# Patient Record
Sex: FEMALE | URBAN - METROPOLITAN AREA
[De-identification: names, ages, dates, MRNs, and addresses within clinical notes are randomized per-mention and may not be internally consistent; named-entity substitution may affect disease eponyms.]

---

## 2017-01-13 ENCOUNTER — HOSPITAL ENCOUNTER (INPATIENT)
Facility: CLINIC | Age: 22
LOS: 2 days | Discharge: HOME | DRG: 637 | End: 2017-01-16
Attending: EMERGENCY MEDICINE | Admitting: SURGERY
Payer: COMMERCIAL

## 2017-01-13 ENCOUNTER — APPOINTMENT (OUTPATIENT)
Dept: GENERAL RADIOLOGY | Facility: CLINIC | Age: 22
DRG: 637 | End: 2017-01-13
Attending: EMERGENCY MEDICINE
Payer: COMMERCIAL

## 2017-01-13 ENCOUNTER — TELEPHONE (OUTPATIENT)
Dept: NURSING | Facility: CLINIC | Age: 22
End: 2017-01-13

## 2017-01-13 DIAGNOSIS — N28.9 RENAL INSUFFICIENCY: ICD-10-CM

## 2017-01-13 DIAGNOSIS — E10.9 TYPE 1 DIABETES MELLITUS WITHOUT COMPLICATION (H): Primary | ICD-10-CM

## 2017-01-13 DIAGNOSIS — E10.9 DIABETES MELLITUS TYPE 1 (H): ICD-10-CM

## 2017-01-13 DIAGNOSIS — E10.10 DIABETIC KETOACIDOSIS WITHOUT COMA ASSOCIATED WITH TYPE 1 DIABETES MELLITUS (H): ICD-10-CM

## 2017-01-13 DIAGNOSIS — E87.5 HYPERKALEMIA: ICD-10-CM

## 2017-01-13 DIAGNOSIS — R41.0 DELIRIUM: ICD-10-CM

## 2017-01-13 LAB
ALBUMIN SERPL-MCNC: 3.5 G/DL (ref 3.4–5)
ALBUMIN UR-MCNC: 100 MG/DL
ALP SERPL-CCNC: 128 U/L (ref 40–150)
ALT SERPL W P-5'-P-CCNC: 28 U/L (ref 0–50)
ANION GAP SERPL CALCULATED.3IONS-SCNC: 23 MMOL/L (ref 3–14)
APPEARANCE UR: ABNORMAL
AST SERPL W P-5'-P-CCNC: 22 U/L (ref 0–45)
BASOPHILS # BLD AUTO: 0 10E9/L (ref 0–0.2)
BASOPHILS NFR BLD AUTO: 0.1 %
BILIRUB SERPL-MCNC: 0.3 MG/DL (ref 0.2–1.3)
BILIRUB UR QL STRIP: NEGATIVE
BUN SERPL-MCNC: 27 MG/DL (ref 7–30)
CALCIUM SERPL-MCNC: 8.5 MG/DL (ref 8.5–10.1)
CHLORIDE SERPL-SCNC: 108 MMOL/L (ref 94–109)
CO2 BLDCOV-SCNC: 5 MMOL/L (ref 21–28)
CO2 SERPL-SCNC: 9 MMOL/L (ref 20–32)
COLOR UR AUTO: ABNORMAL
CREAT BLD-MCNC: 1.4 MG/DL (ref 0.52–1.04)
CREAT SERPL-MCNC: 1.45 MG/DL (ref 0.52–1.04)
DIFFERENTIAL METHOD BLD: ABNORMAL
EOSINOPHIL # BLD AUTO: 0 10E9/L (ref 0–0.7)
EOSINOPHIL NFR BLD AUTO: 0 %
ERYTHROCYTE [DISTWIDTH] IN BLOOD BY AUTOMATED COUNT: 14.4 % (ref 10–15)
GFR SERPL CREATININE-BSD FRML MDRD: 45 ML/MIN/1.7M2
GFR SERPL CREATININE-BSD FRML MDRD: 47 ML/MIN/1.7M2
GLUCOSE BLDC GLUCOMTR-MCNC: 270 MG/DL (ref 70–99)
GLUCOSE BLDC GLUCOMTR-MCNC: 287 MG/DL (ref 70–99)
GLUCOSE BLDC GLUCOMTR-MCNC: 371 MG/DL (ref 70–99)
GLUCOSE BLDC GLUCOMTR-MCNC: 378 MG/DL (ref 70–99)
GLUCOSE SERPL-MCNC: 294 MG/DL (ref 70–99)
GLUCOSE UR STRIP-MCNC: >1000 MG/DL
HBA1C MFR BLD: 13.7 % (ref 4.3–6)
HCG UR QL: NEGATIVE
HCT VFR BLD AUTO: 43.5 % (ref 35–47)
HGB BLD-MCNC: 13.8 G/DL (ref 11.7–15.7)
HGB UR QL STRIP: ABNORMAL
IMM GRANULOCYTES # BLD: 0.5 10E9/L (ref 0–0.4)
IMM GRANULOCYTES NFR BLD: 1.6 %
KETONES BLD-SCNC: 4.2 MMOL/L (ref 0–0.6)
KETONES UR STRIP-MCNC: >150 MG/DL
LACTATE BLD-SCNC: 2.7 MMOL/L (ref 0.7–2.1)
LACTATE BLD-SCNC: 2.9 MMOL/L (ref 0.7–2.1)
LEUKOCYTE ESTERASE UR QL STRIP: NEGATIVE
LYMPHOCYTES # BLD AUTO: 1.9 10E9/L (ref 0.8–5.3)
LYMPHOCYTES NFR BLD AUTO: 6.7 %
MAGNESIUM SERPL-MCNC: 2.2 MG/DL (ref 1.6–2.3)
MCH RBC QN AUTO: 30.4 PG (ref 26.5–33)
MCHC RBC AUTO-ENTMCNC: 31.7 G/DL (ref 31.5–36.5)
MCV RBC AUTO: 96 FL (ref 78–100)
MONOCYTES # BLD AUTO: 2 10E9/L (ref 0–1.3)
MONOCYTES NFR BLD AUTO: 7 %
NEUTROPHILS # BLD AUTO: 23.8 10E9/L (ref 1.6–8.3)
NEUTROPHILS NFR BLD AUTO: 84.6 %
NITRATE UR QL: NEGATIVE
NRBC # BLD AUTO: 0 10*3/UL
NRBC BLD AUTO-RTO: 0 /100
PCO2 BLDV: 26 MM HG (ref 40–50)
PH BLDV: 6.92 PH (ref 7.32–7.43)
PH UR STRIP: 5.5 PH (ref 5–7)
PHOSPHATE SERPL-MCNC: 5.3 MG/DL (ref 2.5–4.5)
PLATELET # BLD AUTO: 357 10E9/L (ref 150–450)
PO2 BLDV: 28 MM HG (ref 25–47)
POTASSIUM SERPL-SCNC: 5.6 MMOL/L (ref 3.4–5.3)
PROT SERPL-MCNC: 7.9 G/DL (ref 6.8–8.8)
RBC # BLD AUTO: 4.54 10E12/L (ref 3.8–5.2)
RBC #/AREA URNS AUTO: 2 /HPF (ref 0–2)
SAO2 % BLDV FROM PO2: 25 %
SODIUM SERPL-SCNC: 140 MMOL/L (ref 133–144)
SP GR UR STRIP: 1.01 (ref 1–1.03)
SQUAMOUS #/AREA URNS AUTO: 1 /HPF (ref 0–1)
URN SPEC COLLECT METH UR: ABNORMAL
UROBILINOGEN UR STRIP-MCNC: NORMAL MG/DL (ref 0–2)
WBC # BLD AUTO: 28.1 10E9/L (ref 4–11)
WBC #/AREA URNS AUTO: 13 /HPF (ref 0–2)

## 2017-01-13 PROCEDURE — 25000125 ZZHC RX 250: Performed by: EMERGENCY MEDICINE

## 2017-01-13 PROCEDURE — 96365 THER/PROPH/DIAG IV INF INIT: CPT | Performed by: EMERGENCY MEDICINE

## 2017-01-13 PROCEDURE — 99292 CRITICAL CARE ADDL 30 MIN: CPT | Mod: 25 | Performed by: EMERGENCY MEDICINE

## 2017-01-13 PROCEDURE — 85025 COMPLETE CBC W/AUTO DIFF WBC: CPT | Performed by: EMERGENCY MEDICINE

## 2017-01-13 PROCEDURE — 96368 THER/DIAG CONCURRENT INF: CPT | Performed by: EMERGENCY MEDICINE

## 2017-01-13 PROCEDURE — 80329 ANALGESICS NON-OPIOID 1 OR 2: CPT | Performed by: EMERGENCY MEDICINE

## 2017-01-13 PROCEDURE — 25000128 H RX IP 250 OP 636: Performed by: EMERGENCY MEDICINE

## 2017-01-13 PROCEDURE — 81025 URINE PREGNANCY TEST: CPT | Performed by: EMERGENCY MEDICINE

## 2017-01-13 PROCEDURE — 82803 BLOOD GASES ANY COMBINATION: CPT

## 2017-01-13 PROCEDURE — 83605 ASSAY OF LACTIC ACID: CPT | Performed by: EMERGENCY MEDICINE

## 2017-01-13 PROCEDURE — 99291 CRITICAL CARE FIRST HOUR: CPT | Mod: 25 | Performed by: EMERGENCY MEDICINE

## 2017-01-13 PROCEDURE — 87040 BLOOD CULTURE FOR BACTERIA: CPT | Performed by: EMERGENCY MEDICINE

## 2017-01-13 PROCEDURE — 71020 XR CHEST 2 VW: CPT

## 2017-01-13 PROCEDURE — 96375 TX/PRO/DX INJ NEW DRUG ADDON: CPT | Performed by: EMERGENCY MEDICINE

## 2017-01-13 PROCEDURE — 96366 THER/PROPH/DIAG IV INF ADDON: CPT | Performed by: EMERGENCY MEDICINE

## 2017-01-13 PROCEDURE — 82565 ASSAY OF CREATININE: CPT

## 2017-01-13 PROCEDURE — 83036 HEMOGLOBIN GLYCOSYLATED A1C: CPT | Performed by: EMERGENCY MEDICINE

## 2017-01-13 PROCEDURE — 83605 ASSAY OF LACTIC ACID: CPT

## 2017-01-13 PROCEDURE — 96361 HYDRATE IV INFUSION ADD-ON: CPT | Performed by: EMERGENCY MEDICINE

## 2017-01-13 PROCEDURE — 36415 COLL VENOUS BLD VENIPUNCTURE: CPT | Performed by: EMERGENCY MEDICINE

## 2017-01-13 PROCEDURE — 82010 KETONE BODYS QUAN: CPT | Performed by: EMERGENCY MEDICINE

## 2017-01-13 PROCEDURE — 84100 ASSAY OF PHOSPHORUS: CPT | Performed by: EMERGENCY MEDICINE

## 2017-01-13 PROCEDURE — 80053 COMPREHEN METABOLIC PANEL: CPT | Performed by: EMERGENCY MEDICINE

## 2017-01-13 PROCEDURE — 00000146 ZZHCL STATISTIC GLUCOSE BY METER IP

## 2017-01-13 PROCEDURE — 87086 URINE CULTURE/COLONY COUNT: CPT | Performed by: EMERGENCY MEDICINE

## 2017-01-13 PROCEDURE — 81001 URINALYSIS AUTO W/SCOPE: CPT | Performed by: EMERGENCY MEDICINE

## 2017-01-13 PROCEDURE — 82803 BLOOD GASES ANY COMBINATION: CPT | Performed by: EMERGENCY MEDICINE

## 2017-01-13 PROCEDURE — 83735 ASSAY OF MAGNESIUM: CPT | Performed by: EMERGENCY MEDICINE

## 2017-01-13 PROCEDURE — 93005 ELECTROCARDIOGRAM TRACING: CPT | Performed by: EMERGENCY MEDICINE

## 2017-01-13 PROCEDURE — 99285 EMERGENCY DEPT VISIT HI MDM: CPT | Performed by: EMERGENCY MEDICINE

## 2017-01-13 RX ORDER — DEXTROSE MONOHYDRATE 25 G/50ML
25-50 INJECTION, SOLUTION INTRAVENOUS
Status: DISCONTINUED | OUTPATIENT
Start: 2017-01-13 | End: 2017-01-14

## 2017-01-13 RX ORDER — LIDOCAINE 40 MG/G
CREAM TOPICAL
Status: DISCONTINUED | OUTPATIENT
Start: 2017-01-13 | End: 2017-01-16 | Stop reason: HOSPADM

## 2017-01-13 RX ADMIN — SODIUM BICARBONATE 50 MEQ: 84 INJECTION, SOLUTION INTRAVENOUS at 22:08

## 2017-01-13 RX ADMIN — SODIUM BICARBONATE: 84 INJECTION, SOLUTION INTRAVENOUS at 21:51

## 2017-01-13 RX ADMIN — HUMAN INSULIN 3.5 UNITS/HR: 100 INJECTION, SOLUTION SUBCUTANEOUS at 22:06

## 2017-01-13 RX ADMIN — SODIUM CHLORIDE 1000 ML: 9 INJECTION, SOLUTION INTRAVENOUS at 20:19

## 2017-01-13 ASSESSMENT — ENCOUNTER SYMPTOMS
HEMATURIA: 0
BACK PAIN: 1
DIARRHEA: 0
CONFUSION: 1
VOMITING: 0
DYSURIA: 0

## 2017-01-13 NOTE — IP AVS SNAPSHOT
MRN:[id number]                      After Visit Summary   1/13/2017    Harsha Delgadillo    MRN: [id number]           Thank you!     Thank you for choosing [facility] for your care. Our goal is always to provide you with excellent care. Hearing back from our patients is one way we can continue to improve our services. Please take a few minutes to complete the written survey that you may receive in the mail after you visit with us. Thank you!        Patient Information     Date Of Birth          1995        About your hospital stay     You were admitted on:  January 14, 2017 You last received care in the:  [address] [facility]    You were discharged on:  January 16, 2017        Reason for your hospital stay       High blood glucose and diabetic ketoacidosis                  Who to Call     For medical emergencies, please call 911.  For non-urgent questions about your medical care, please call your primary care provider or clinic, None          Attending Provider     Provider    Ronit Melo MD Harmon, James Vail, MD       Primary Care Provider    None       No address on file        After Care Instructions     Activity       Your activity upon discharge: activity as tolerated            Diet       Follow this diet upon discharge: Orders Placed This Encounter  Room Service  Moderate Consistent CHO Diet            Discharge Instructions       Check Blood glucose 4 times a day and keep a log of levels over next two days and call endocrine doctors office on next thrusday with blood glucose levels.                  Follow-up Appointments     Adult [facility] Follow-up and recommended labs and tests       FU with endocrinology in two weeks time for management of diabetes.     Appointments on [place] and/or [facility] (with [facility] or [facility] provider or service). Call [phone] if you haven't heard regarding these appointments within 7 days of discharge.                  Your next  "10 appointments already scheduled     Mar 16, 2017  8:30 AM   (Arrive by 8:00 AM)   RETURN DIABETES with Roberto Bland MD   [facility] ([facility])    [address] [address] [phone]              Pending Results     Date and Time Order Name Status Description    1/14/2017 0242 Blood culture Preliminary     1/14/2017 0232 EKG 12-lead, tracing only - DKA Preliminary     1/13/2017 2056 Blood Culture ONE site Preliminary             Statement of Approval     Ordered          01/16/17 6481  I have reviewed and agree with all the recommendations and orders detailed in this document.   EFFECTIVE NOW     Approved and electronically signed by:  Anastacia Alatorre MD             Admission Information        Provider Department Dept Phone    1/13/2017 Qasim Palmer MD [facility] [phone]      Your Vitals Were     Blood Pressure Temperature Respirations    128/91 mmHg 97.6  F (36.4  C) (Oral) 16    Height Weight BMI (Body Mass Index)    1.575 m (5' 2\") 79.062 kg (174 lb 4.8 oz) 31.87 kg/m2    Pulse Oximetry Last Period       98% 12/29/2016       [employer] Information     [employer] gives you secure access to your electronic health record. If you see a primary care provider, you can also send messages to your care team and make appointments. If you have questions, please call your primary care clinic.  If you do not have a primary care provider, please call [phone] and they will assist you.        Care EveryWhere ID     This is your Care EveryWhere ID. This could be used by other organizations to access your [facility] medical records  [id number]           Review of your medicines      START taking        Dose / Directions    insulin aspart 100 UNITS/ML injection   Commonly known as:  NovoLOG   Used for:  Diabetic ketoacidosis without coma associated with type 1 diabetes mellitus (H)        If Pre-meal Glucose 140 - 164 give 1 unit " 165 - 189 give 2 units 190 - 214 give 3 units 215 - 239 give 4 units 240 - 264 give 5 units 265 - 289 give 6 units 290 - 314 give 7 units 315 - 339 give 8 units > 339 give 9 units  If Bedtime Glucose 200 - 214 give 1.5 units  215 - 239 give 2 units 240 - 264 give 2.5 units 265 - 289 give 3 units 290 - 314 give 3.5 units 315 - 339 give 4 units > 339 give 4.5 units   Quantity:  10 mL   Refills:  0       phosphorus tablet 250 mg 250 MG per tablet   Commonly known as:  K PHOS NEUTRAL   Used for:  Diabetic ketoacidosis without coma associated with type 1 diabetes mellitus (H)        Dose:  250 mg   Take 1 tablet (250 mg) by mouth 2 times daily   Quantity:  6 tablet   Refills:  0         CONTINUE these medicines which may have CHANGED, or have new prescriptions. If we are uncertain of the size of tablets/capsules you have at home, strength may be listed as something that might have changed.        Dose / Directions    insulin glargine 100 UNIT/ML injection   Commonly known as:  LANTUS SOLOSTAR   This may have changed:  how much to take   Used for:  Type 1 diabetes mellitus without complication (H)        Dose:  45 Units   Inject 45 Units Subcutaneous daily (with dinner)   Quantity:  15 mL   Refills:  3         CONTINUE these medicines which have NOT CHANGED        Dose / Directions    acetone (Urine) test Strp        Refills:  0       blood glucose monitoring lancets   Used for:  Diabetes mellitus type 1 (H)        Use to test blood sugar 6-8 times daily or as directed.  Ok to substitute alternative if insurance prefers.   Quantity:  1 Box   Refills:  prn       blood glucose monitoring test strip   Commonly known as:  ACCU-CHEK BUNNY PLUS   Used for:  Diabetes mellitus type 1 (H)        Use to test blood sugar 6-8 times daily or as directed.  Ok to substitute alternative if insurance prefers.   Quantity:  200 each   Refills:  12       FLUoxetine 20 MG capsule   Commonly known as:  PROzac   Used for:  Depression,  "unspecified depression type        Dose:  20 mg   Take 1 capsule (20 mg) by mouth daily   Quantity:  30 capsule   Refills:  2       glucagon 1 MG kit   Used for:  Type 1 diabetes mellitus without complication (H)        Dose:  1 mg   Inject 1 mg into the muscle once for 1 dose   Quantity:  1 mg   Refills:  1       insulin lispro 100 UNIT/ML Cartridge   Commonly known as:  HumaLOG PENFILL   Used for:  Diabetes mellitus type 1 (H)        For use with Luxura pen device. (patient has pen device already). Use 1 unit: 7 grams of carbohydrate for meals and snacks. Use correction scale per instructions.   Quantity:  15 mL   Refills:  3       Pen Needles 3/16\" 31G X 5 MM Misc        Refills:  0         STOP taking     MASTISOL LIQUID ADHESIVE EX                Where to get your medicines      These medications were sent to [facility] - [place], [place] - [address] [address]     Phone:  [phone]    - insulin aspart 100 UNITS/ML injection  - insulin glargine 100 UNIT/ML injection  - phosphorus tablet 250 mg 250 MG per tablet             Protect others around you: Learn how to safely use, store and throw away your medicines at www.disposemymeds.org.             Medication List: This is a list of all your medications and when to take them. Check marks below indicate your daily home schedule. Keep this list as a reference.      Medications           Morning Afternoon Evening Bedtime As Needed    acetone (Urine) test Strp                                blood glucose monitoring lancets   Use to test blood sugar 6-8 times daily or as directed.  Ok to substitute alternative if insurance prefers.                                blood glucose monitoring test strip   Commonly known as:  ACCU-CHEK BUNNY PLUS   Use to test blood sugar 6-8 times daily or as directed.  Ok to substitute alternative if insurance prefers.                                FLUoxetine 20 MG capsule " "  Commonly known as:  PROzac   Take 1 capsule (20 mg) by mouth daily                                glucagon 1 MG kit   Inject 1 mg into the muscle once for 1 dose                                insulin aspart 100 UNITS/ML injection   Commonly known as:  NovoLOG   If Pre-meal Glucose 140 - 164 give 1 unit 165 - 189 give 2 units 190 - 214 give 3 units 215 - 239 give 4 units 240 - 264 give 5 units 265 - 289 give 6 units 290 - 314 give 7 units 315 - 339 give 8 units > 339 give 9 units  If Bedtime Glucose 200 - 214 give 1.5 units  215 - 239 give 2 units 240 - 264 give 2.5 units 265 - 289 give 3 units 290 - 314 give 3.5 units 315 - 339 give 4 units > 339 give 4.5 units                                insulin glargine 100 UNIT/ML injection   Commonly known as:  LANTUS SOLOSTAR   Inject 45 Units Subcutaneous daily (with dinner)   Last time this was given:  45 Units on 1/16/2017  8:41 AM                                insulin lispro 100 UNIT/ML Cartridge   Commonly known as:  HumaLOG PENFILL   For use with [employer] pen device. (patient has pen device already). Use 1 unit: 7 grams of carbohydrate for meals and snacks. Use correction scale per instructions.                                Pen Needles 3/16\" 31G X 5 MM Misc                                phosphorus tablet 250 mg 250 MG per tablet   Commonly known as:  K PHOS NEUTRAL   Take 1 tablet (250 mg) by mouth 2 times daily   Last time this was given:  250 mg on 1/16/2017  8:41 AM                                  "

## 2017-01-13 NOTE — IP AVS SNAPSHOT
[address] [address]    Phone:  [phone]                                       After Visit Summary   1/13/2017    Harsha Delgadillo    MRN: [id number]           After Visit Summary Signature Page     I have received my discharge instructions, and my questions have been answered. I have discussed any challenges I see with this plan with the nurse or doctor.    ..........................................................................................................................................  Patient/Patient Representative Signature      ..........................................................................................................................................  Patient Representative Print Name and Relationship to Patient    ..................................................               ................................................  Date                                            Time    ..........................................................................................................................................  Reviewed by Signature/Title    ...................................................              ..............................................  Date                                                            Time

## 2017-01-14 ENCOUNTER — APPOINTMENT (OUTPATIENT)
Dept: CT IMAGING | Facility: CLINIC | Age: 22
DRG: 637 | End: 2017-01-14
Attending: EMERGENCY MEDICINE
Payer: COMMERCIAL

## 2017-01-14 PROBLEM — E11.10 DKA (DIABETIC KETOACIDOSES): Status: ACTIVE | Noted: 2017-01-14

## 2017-01-14 LAB
ALBUMIN SERPL-MCNC: 2.5 G/DL (ref 3.4–5)
ALP SERPL-CCNC: 89 U/L (ref 40–150)
ALT SERPL W P-5'-P-CCNC: 15 U/L (ref 0–50)
ANION GAP SERPL CALCULATED.3IONS-SCNC: 12 MMOL/L (ref 3–14)
ANION GAP SERPL CALCULATED.3IONS-SCNC: 14 MMOL/L (ref 3–14)
ANION GAP SERPL CALCULATED.3IONS-SCNC: 16 MMOL/L (ref 3–14)
ANION GAP SERPL CALCULATED.3IONS-SCNC: 17 MMOL/L (ref 3–14)
ANION GAP SERPL CALCULATED.3IONS-SCNC: 18 MMOL/L (ref 3–14)
ANION GAP SERPL CALCULATED.3IONS-SCNC: 19 MMOL/L (ref 3–14)
ANION GAP SERPL CALCULATED.3IONS-SCNC: 19 MMOL/L (ref 3–14)
ANION GAP SERPL CALCULATED.3IONS-SCNC: 20 MMOL/L (ref 3–14)
AST SERPL W P-5'-P-CCNC: 15 U/L (ref 0–45)
BASE DEFICIT BLDA-SCNC: 12.2 MMOL/L
BASE DEFICIT BLDV-SCNC: 14.8 MMOL/L
BASE DEFICIT BLDV-SCNC: 23.6 MMOL/L
BASOPHILS # BLD AUTO: 0 10E9/L (ref 0–0.2)
BASOPHILS NFR BLD AUTO: 0 %
BILIRUB DIRECT SERPL-MCNC: <0.1 MG/DL (ref 0–0.2)
BILIRUB SERPL-MCNC: 0.4 MG/DL (ref 0.2–1.3)
BUN SERPL-MCNC: 10 MG/DL (ref 7–30)
BUN SERPL-MCNC: 11 MG/DL (ref 7–30)
BUN SERPL-MCNC: 13 MG/DL (ref 7–30)
BUN SERPL-MCNC: 14 MG/DL (ref 7–30)
BUN SERPL-MCNC: 16 MG/DL (ref 7–30)
BUN SERPL-MCNC: 18 MG/DL (ref 7–30)
BUN SERPL-MCNC: 20 MG/DL (ref 7–30)
BUN SERPL-MCNC: 21 MG/DL (ref 7–30)
CALCIUM SERPL-MCNC: 6.7 MG/DL (ref 8.5–10.1)
CALCIUM SERPL-MCNC: 6.8 MG/DL (ref 8.5–10.1)
CALCIUM SERPL-MCNC: 6.8 MG/DL (ref 8.5–10.1)
CALCIUM SERPL-MCNC: 6.9 MG/DL (ref 8.5–10.1)
CALCIUM SERPL-MCNC: 7 MG/DL (ref 8.5–10.1)
CALCIUM SERPL-MCNC: 7 MG/DL (ref 8.5–10.1)
CALCIUM SERPL-MCNC: 7.2 MG/DL (ref 8.5–10.1)
CALCIUM SERPL-MCNC: 7.3 MG/DL (ref 8.5–10.1)
CALCIUM SERPL-MCNC: 7.5 MG/DL (ref 8.5–10.1)
CALCIUM SERPL-MCNC: 7.6 MG/DL (ref 8.5–10.1)
CHLORIDE SERPL-SCNC: 107 MMOL/L (ref 94–109)
CHLORIDE SERPL-SCNC: 108 MMOL/L (ref 94–109)
CHLORIDE SERPL-SCNC: 110 MMOL/L (ref 94–109)
CHLORIDE SERPL-SCNC: 111 MMOL/L (ref 94–109)
CHLORIDE SERPL-SCNC: 112 MMOL/L (ref 94–109)
CHLORIDE SERPL-SCNC: 113 MMOL/L (ref 94–109)
CHLORIDE SERPL-SCNC: 113 MMOL/L (ref 94–109)
CHLORIDE SERPL-SCNC: 114 MMOL/L (ref 94–109)
CO2 SERPL-SCNC: 11 MMOL/L (ref 20–32)
CO2 SERPL-SCNC: 12 MMOL/L (ref 20–32)
CO2 SERPL-SCNC: 14 MMOL/L (ref 20–32)
CO2 SERPL-SCNC: 15 MMOL/L (ref 20–32)
CO2 SERPL-SCNC: 17 MMOL/L (ref 20–32)
CO2 SERPL-SCNC: 17 MMOL/L (ref 20–32)
CO2 SERPL-SCNC: 18 MMOL/L (ref 20–32)
CO2 SERPL-SCNC: 20 MMOL/L (ref 20–32)
CO2 SERPL-SCNC: 21 MMOL/L (ref 20–32)
CO2 SERPL-SCNC: 21 MMOL/L (ref 20–32)
CREAT SERPL-MCNC: 0.93 MG/DL (ref 0.52–1.04)
CREAT SERPL-MCNC: 0.99 MG/DL (ref 0.52–1.04)
CREAT SERPL-MCNC: 1.04 MG/DL (ref 0.52–1.04)
CREAT SERPL-MCNC: 1.06 MG/DL (ref 0.52–1.04)
CREAT SERPL-MCNC: 1.07 MG/DL (ref 0.52–1.04)
CREAT SERPL-MCNC: 1.11 MG/DL (ref 0.52–1.04)
CREAT SERPL-MCNC: 1.13 MG/DL (ref 0.52–1.04)
CREAT SERPL-MCNC: 1.19 MG/DL (ref 0.52–1.04)
DIFFERENTIAL METHOD BLD: ABNORMAL
EOSINOPHIL # BLD AUTO: 0 10E9/L (ref 0–0.7)
EOSINOPHIL NFR BLD AUTO: 0 %
ERYTHROCYTE [DISTWIDTH] IN BLOOD BY AUTOMATED COUNT: 14.1 % (ref 10–15)
GFR SERPL CREATININE-BSD FRML MDRD: 57 ML/MIN/1.7M2
GFR SERPL CREATININE-BSD FRML MDRD: 60 ML/MIN/1.7M2
GFR SERPL CREATININE-BSD FRML MDRD: 61 ML/MIN/1.7M2
GFR SERPL CREATININE-BSD FRML MDRD: 64 ML/MIN/1.7M2
GFR SERPL CREATININE-BSD FRML MDRD: 65 ML/MIN/1.7M2
GFR SERPL CREATININE-BSD FRML MDRD: 66 ML/MIN/1.7M2
GFR SERPL CREATININE-BSD FRML MDRD: 70 ML/MIN/1.7M2
GFR SERPL CREATININE-BSD FRML MDRD: 75 ML/MIN/1.7M2
GLUCOSE BLDC GLUCOMTR-MCNC: 112 MG/DL (ref 70–99)
GLUCOSE BLDC GLUCOMTR-MCNC: 127 MG/DL (ref 70–99)
GLUCOSE BLDC GLUCOMTR-MCNC: 131 MG/DL (ref 70–99)
GLUCOSE BLDC GLUCOMTR-MCNC: 134 MG/DL (ref 70–99)
GLUCOSE BLDC GLUCOMTR-MCNC: 137 MG/DL (ref 70–99)
GLUCOSE BLDC GLUCOMTR-MCNC: 141 MG/DL (ref 70–99)
GLUCOSE BLDC GLUCOMTR-MCNC: 148 MG/DL (ref 70–99)
GLUCOSE BLDC GLUCOMTR-MCNC: 149 MG/DL (ref 70–99)
GLUCOSE BLDC GLUCOMTR-MCNC: 152 MG/DL (ref 70–99)
GLUCOSE BLDC GLUCOMTR-MCNC: 153 MG/DL (ref 70–99)
GLUCOSE BLDC GLUCOMTR-MCNC: 155 MG/DL (ref 70–99)
GLUCOSE BLDC GLUCOMTR-MCNC: 158 MG/DL (ref 70–99)
GLUCOSE BLDC GLUCOMTR-MCNC: 159 MG/DL (ref 70–99)
GLUCOSE BLDC GLUCOMTR-MCNC: 160 MG/DL (ref 70–99)
GLUCOSE BLDC GLUCOMTR-MCNC: 165 MG/DL (ref 70–99)
GLUCOSE BLDC GLUCOMTR-MCNC: 171 MG/DL (ref 70–99)
GLUCOSE BLDC GLUCOMTR-MCNC: 201 MG/DL (ref 70–99)
GLUCOSE BLDC GLUCOMTR-MCNC: 212 MG/DL (ref 70–99)
GLUCOSE BLDC GLUCOMTR-MCNC: 225 MG/DL (ref 70–99)
GLUCOSE BLDC GLUCOMTR-MCNC: 246 MG/DL (ref 70–99)
GLUCOSE BLDC GLUCOMTR-MCNC: 257 MG/DL (ref 70–99)
GLUCOSE BLDC GLUCOMTR-MCNC: 323 MG/DL (ref 70–99)
GLUCOSE BLDC GLUCOMTR-MCNC: 89 MG/DL (ref 70–99)
GLUCOSE SERPL-MCNC: 116 MG/DL (ref 70–99)
GLUCOSE SERPL-MCNC: 121 MG/DL (ref 70–99)
GLUCOSE SERPL-MCNC: 138 MG/DL (ref 70–99)
GLUCOSE SERPL-MCNC: 141 MG/DL (ref 70–99)
GLUCOSE SERPL-MCNC: 146 MG/DL (ref 70–99)
GLUCOSE SERPL-MCNC: 150 MG/DL (ref 70–99)
GLUCOSE SERPL-MCNC: 150 MG/DL (ref 70–99)
GLUCOSE SERPL-MCNC: 159 MG/DL (ref 70–99)
GLUCOSE SERPL-MCNC: 264 MG/DL (ref 70–99)
GLUCOSE SERPL-MCNC: 283 MG/DL (ref 70–99)
HCO3 BLD-SCNC: 13 MMOL/L (ref 21–28)
HCO3 BLDV-SCNC: 11 MMOL/L (ref 21–28)
HCO3 BLDV-SCNC: 5 MMOL/L (ref 21–28)
HCT VFR BLD AUTO: 34 % (ref 35–47)
HGB BLD-MCNC: 11.2 G/DL (ref 11.7–15.7)
IMM GRANULOCYTES # BLD: 0.2 10E9/L (ref 0–0.4)
IMM GRANULOCYTES NFR BLD: 0.9 %
INTERPRETATION ECG - MUSE: NORMAL
KETONES BLD-SCNC: 0.3 MMOL/L (ref 0–0.6)
KETONES BLD-SCNC: 2 MMOL/L (ref 0–0.6)
KETONES BLD-SCNC: 2.6 MMOL/L (ref 0–0.6)
KETONES BLD-SCNC: 2.9 MMOL/L (ref 0–0.6)
KETONES BLD-SCNC: 3.1 MMOL/L (ref 0–0.6)
KETONES BLD-SCNC: 3.5 MMOL/L (ref 0–0.6)
KETONES BLD-SCNC: 4.1 MMOL/L (ref 0–0.6)
KETONES BLD-SCNC: 4.4 MMOL/L (ref 0–0.6)
KETONES BLD-SCNC: 5.4 MMOL/L (ref 0–0.6)
KETONES BLD-SCNC: 5.6 MMOL/L (ref 0–0.6)
LACTATE BLD-SCNC: 1 MMOL/L (ref 0.7–2.1)
LYMPHOCYTES # BLD AUTO: 2.4 10E9/L (ref 0.8–5.3)
LYMPHOCYTES NFR BLD AUTO: 11.5 %
MAGNESIUM SERPL-MCNC: 1.5 MG/DL (ref 1.6–2.3)
MAGNESIUM SERPL-MCNC: 2 MG/DL (ref 1.6–2.3)
MAGNESIUM SERPL-MCNC: 2.2 MG/DL (ref 1.6–2.3)
MAGNESIUM SERPL-MCNC: 2.4 MG/DL (ref 1.6–2.3)
MCH RBC QN AUTO: 29.7 PG (ref 26.5–33)
MCHC RBC AUTO-ENTMCNC: 32.9 G/DL (ref 31.5–36.5)
MCV RBC AUTO: 90 FL (ref 78–100)
MONOCYTES # BLD AUTO: 2.7 10E9/L (ref 0–1.3)
MONOCYTES NFR BLD AUTO: 13.1 %
MRSA DNA SPEC QL NAA+PROBE: NORMAL
NEUTROPHILS # BLD AUTO: 15.2 10E9/L (ref 1.6–8.3)
NEUTROPHILS NFR BLD AUTO: 74.5 %
NRBC # BLD AUTO: 0 10*3/UL
NRBC BLD AUTO-RTO: 0 /100
O2/TOTAL GAS SETTING VFR VENT: 21 %
OXYHGB MFR BLD: 97 % (ref 92–100)
PCO2 BLD: 25 MM HG (ref 35–45)
PCO2 BLDV: 20 MM HG (ref 40–50)
PCO2 BLDV: 24 MM HG (ref 40–50)
PH BLD: 7.32 PH (ref 7.35–7.45)
PH BLDV: 7.04 PH (ref 7.32–7.43)
PH BLDV: 7.27 PH (ref 7.32–7.43)
PHOSPHATE SERPL-MCNC: 0.4 MG/DL (ref 2.5–4.5)
PHOSPHATE SERPL-MCNC: 0.6 MG/DL (ref 2.5–4.5)
PHOSPHATE SERPL-MCNC: 1.1 MG/DL (ref 2.5–4.5)
PHOSPHATE SERPL-MCNC: 2 MG/DL (ref 2.5–4.5)
PHOSPHATE SERPL-MCNC: 2.2 MG/DL (ref 2.5–4.5)
PLATELET # BLD AUTO: 260 10E9/L (ref 150–450)
PLATELET # BLD EST: ABNORMAL 10*3/UL
PO2 BLD: 97 MM HG (ref 80–105)
PO2 BLDV: 38 MM HG (ref 25–47)
PO2 BLDV: 52 MM HG (ref 25–47)
POTASSIUM SERPL-SCNC: 2.8 MMOL/L (ref 3.4–5.3)
POTASSIUM SERPL-SCNC: 3.2 MMOL/L (ref 3.4–5.3)
POTASSIUM SERPL-SCNC: 3.3 MMOL/L (ref 3.4–5.3)
POTASSIUM SERPL-SCNC: 3.4 MMOL/L (ref 3.4–5.3)
POTASSIUM SERPL-SCNC: 3.5 MMOL/L (ref 3.4–5.3)
POTASSIUM SERPL-SCNC: 3.8 MMOL/L (ref 3.4–5.3)
POTASSIUM SERPL-SCNC: 3.9 MMOL/L (ref 3.4–5.3)
POTASSIUM SERPL-SCNC: 4.4 MMOL/L (ref 3.4–5.3)
PROCALCITONIN SERPL-MCNC: 3.32 NG/ML
PROT SERPL-MCNC: 5.5 G/DL (ref 6.8–8.8)
RBC # BLD AUTO: 3.77 10E12/L (ref 3.8–5.2)
RBC MORPH BLD: NORMAL
SALICYLATES SERPL-MCNC: 4 MG/DL
SODIUM SERPL-SCNC: 140 MMOL/L (ref 133–144)
SODIUM SERPL-SCNC: 140 MMOL/L (ref 133–144)
SODIUM SERPL-SCNC: 141 MMOL/L (ref 133–144)
SODIUM SERPL-SCNC: 142 MMOL/L (ref 133–144)
SODIUM SERPL-SCNC: 143 MMOL/L (ref 133–144)
SODIUM SERPL-SCNC: 144 MMOL/L (ref 133–144)
SODIUM SERPL-SCNC: 145 MMOL/L (ref 133–144)
SODIUM SERPL-SCNC: 145 MMOL/L (ref 133–144)
SODIUM SERPL-SCNC: 146 MMOL/L (ref 133–144)
SODIUM SERPL-SCNC: 147 MMOL/L (ref 133–144)
SPECIMEN SOURCE: NORMAL
WBC # BLD AUTO: 20.4 10E9/L (ref 4–11)

## 2017-01-14 PROCEDURE — 84145 PROCALCITONIN (PCT): CPT | Performed by: SURGERY

## 2017-01-14 PROCEDURE — 00000146 ZZHCL STATISTIC GLUCOSE BY METER IP

## 2017-01-14 PROCEDURE — 80048 BASIC METABOLIC PNL TOTAL CA: CPT | Performed by: INTERNAL MEDICINE

## 2017-01-14 PROCEDURE — 25000132 ZZH RX MED GY IP 250 OP 250 PS 637: Performed by: INTERNAL MEDICINE

## 2017-01-14 PROCEDURE — 93010 ELECTROCARDIOGRAM REPORT: CPT | Performed by: INTERNAL MEDICINE

## 2017-01-14 PROCEDURE — 36600 WITHDRAWAL OF ARTERIAL BLOOD: CPT

## 2017-01-14 PROCEDURE — 27210995 ZZH RX 272: Performed by: SURGERY

## 2017-01-14 PROCEDURE — 25800025 ZZH RX 258: Performed by: INTERNAL MEDICINE

## 2017-01-14 PROCEDURE — 25000125 ZZHC RX 250: Performed by: EMERGENCY MEDICINE

## 2017-01-14 PROCEDURE — 25000128 H RX IP 250 OP 636

## 2017-01-14 PROCEDURE — 99207 ZZC APP CREDIT; MD BILLING SHARED VISIT: CPT | Performed by: SURGERY

## 2017-01-14 PROCEDURE — 82010 KETONE BODYS QUAN: CPT | Performed by: SURGERY

## 2017-01-14 PROCEDURE — 40000556 ZZH STATISTIC PERIPHERAL IV START W US GUIDANCE

## 2017-01-14 PROCEDURE — 25000125 ZZHC RX 250: Performed by: SURGERY

## 2017-01-14 PROCEDURE — 25800025 ZZH RX 258: Performed by: SURGERY

## 2017-01-14 PROCEDURE — 87641 MR-STAPH DNA AMP PROBE: CPT | Performed by: INTERNAL MEDICINE

## 2017-01-14 PROCEDURE — 82803 BLOOD GASES ANY COMBINATION: CPT | Performed by: INTERNAL MEDICINE

## 2017-01-14 PROCEDURE — 80048 BASIC METABOLIC PNL TOTAL CA: CPT | Performed by: STUDENT IN AN ORGANIZED HEALTH CARE EDUCATION/TRAINING PROGRAM

## 2017-01-14 PROCEDURE — 96375 TX/PRO/DX INJ NEW DRUG ADDON: CPT | Performed by: EMERGENCY MEDICINE

## 2017-01-14 PROCEDURE — 85025 COMPLETE CBC W/AUTO DIFF WBC: CPT | Performed by: SURGERY

## 2017-01-14 PROCEDURE — 87640 STAPH A DNA AMP PROBE: CPT | Performed by: INTERNAL MEDICINE

## 2017-01-14 PROCEDURE — 82805 BLOOD GASES W/O2 SATURATION: CPT | Performed by: SURGERY

## 2017-01-14 PROCEDURE — 25000125 ZZHC RX 250

## 2017-01-14 PROCEDURE — 80048 BASIC METABOLIC PNL TOTAL CA: CPT | Performed by: SURGERY

## 2017-01-14 PROCEDURE — 25000125 ZZHC RX 250: Performed by: INTERNAL MEDICINE

## 2017-01-14 PROCEDURE — 20000004 ZZH R&B ICU UMMC

## 2017-01-14 PROCEDURE — 80076 HEPATIC FUNCTION PANEL: CPT | Performed by: SURGERY

## 2017-01-14 PROCEDURE — 83735 ASSAY OF MAGNESIUM: CPT | Performed by: SURGERY

## 2017-01-14 PROCEDURE — 25000128 H RX IP 250 OP 636: Performed by: INTERNAL MEDICINE

## 2017-01-14 PROCEDURE — 74176 CT ABD & PELVIS W/O CONTRAST: CPT

## 2017-01-14 PROCEDURE — 96376 TX/PRO/DX INJ SAME DRUG ADON: CPT | Performed by: EMERGENCY MEDICINE

## 2017-01-14 PROCEDURE — 25000132 ZZH RX MED GY IP 250 OP 250 PS 637

## 2017-01-14 PROCEDURE — 82010 KETONE BODYS QUAN: CPT | Performed by: STUDENT IN AN ORGANIZED HEALTH CARE EDUCATION/TRAINING PROGRAM

## 2017-01-14 PROCEDURE — 87040 BLOOD CULTURE FOR BACTERIA: CPT | Performed by: SURGERY

## 2017-01-14 PROCEDURE — 93005 ELECTROCARDIOGRAM TRACING: CPT

## 2017-01-14 PROCEDURE — 96361 HYDRATE IV INFUSION ADD-ON: CPT | Performed by: EMERGENCY MEDICINE

## 2017-01-14 PROCEDURE — 84100 ASSAY OF PHOSPHORUS: CPT | Performed by: SURGERY

## 2017-01-14 PROCEDURE — 25000128 H RX IP 250 OP 636: Performed by: EMERGENCY MEDICINE

## 2017-01-14 PROCEDURE — 99291 CRITICAL CARE FIRST HOUR: CPT | Mod: GC | Performed by: ANESTHESIOLOGY

## 2017-01-14 PROCEDURE — 84132 ASSAY OF SERUM POTASSIUM: CPT | Performed by: EMERGENCY MEDICINE

## 2017-01-14 PROCEDURE — 83605 ASSAY OF LACTIC ACID: CPT | Performed by: SURGERY

## 2017-01-14 PROCEDURE — 40000275 ZZH STATISTIC RCP TIME EA 10 MIN

## 2017-01-14 RX ORDER — SODIUM CHLORIDE 450 MG/100ML
INJECTION, SOLUTION INTRAVENOUS CONTINUOUS
Status: DISCONTINUED | OUTPATIENT
Start: 2017-01-14 | End: 2017-01-14

## 2017-01-14 RX ORDER — SODIUM CHLORIDE 9 MG/ML
INJECTION, SOLUTION INTRAVENOUS CONTINUOUS
Status: DISCONTINUED | OUTPATIENT
Start: 2017-01-14 | End: 2017-01-14

## 2017-01-14 RX ORDER — HYDROMORPHONE HCL/0.9% NACL/PF 0.2MG/0.2
0.2 SYRINGE (ML) INTRAVENOUS ONCE
Status: COMPLETED | OUTPATIENT
Start: 2017-01-14 | End: 2017-01-14

## 2017-01-14 RX ORDER — HEPARIN SODIUM 5000 [USP'U]/.5ML
5000 INJECTION, SOLUTION INTRAVENOUS; SUBCUTANEOUS EVERY 8 HOURS
Status: DISCONTINUED | OUTPATIENT
Start: 2017-01-14 | End: 2017-01-16 | Stop reason: HOSPADM

## 2017-01-14 RX ORDER — MAGNESIUM SULFATE HEPTAHYDRATE 40 MG/ML
4 INJECTION, SOLUTION INTRAVENOUS EVERY 4 HOURS PRN
Status: DISCONTINUED | OUTPATIENT
Start: 2017-01-14 | End: 2017-01-16 | Stop reason: HOSPADM

## 2017-01-14 RX ORDER — ACETAMINOPHEN 325 MG/1
650 TABLET ORAL EVERY 6 HOURS PRN
Status: DISCONTINUED | OUTPATIENT
Start: 2017-01-14 | End: 2017-01-16 | Stop reason: HOSPADM

## 2017-01-14 RX ORDER — SODIUM CHLORIDE, SODIUM LACTATE, POTASSIUM CHLORIDE, CALCIUM CHLORIDE 600; 310; 30; 20 MG/100ML; MG/100ML; MG/100ML; MG/100ML
INJECTION, SOLUTION INTRAVENOUS CONTINUOUS
Status: DISCONTINUED | OUTPATIENT
Start: 2017-01-14 | End: 2017-01-14

## 2017-01-14 RX ORDER — POTASSIUM CHLORIDE 7.45 MG/ML
10 INJECTION INTRAVENOUS
Status: DISCONTINUED | OUTPATIENT
Start: 2017-01-14 | End: 2017-01-16 | Stop reason: HOSPADM

## 2017-01-14 RX ORDER — POTASSIUM CHLORIDE 1.5 G/1.58G
20-40 POWDER, FOR SOLUTION ORAL
Status: DISCONTINUED | OUTPATIENT
Start: 2017-01-14 | End: 2017-01-16 | Stop reason: HOSPADM

## 2017-01-14 RX ORDER — NICOTINE POLACRILEX 4 MG
15-30 LOZENGE BUCCAL
Status: DISCONTINUED | OUTPATIENT
Start: 2017-01-14 | End: 2017-01-16 | Stop reason: HOSPADM

## 2017-01-14 RX ORDER — NALOXONE HYDROCHLORIDE 0.4 MG/ML
.1-.4 INJECTION, SOLUTION INTRAMUSCULAR; INTRAVENOUS; SUBCUTANEOUS
Status: DISCONTINUED | OUTPATIENT
Start: 2017-01-14 | End: 2017-01-16 | Stop reason: HOSPADM

## 2017-01-14 RX ORDER — PIPERACILLIN SODIUM, TAZOBACTAM SODIUM 4; .5 G/20ML; G/20ML
4.5 INJECTION, POWDER, LYOPHILIZED, FOR SOLUTION INTRAVENOUS EVERY 6 HOURS
Status: DISCONTINUED | OUTPATIENT
Start: 2017-01-14 | End: 2017-01-14

## 2017-01-14 RX ORDER — DEXTROSE, SODIUM CHLORIDE, SODIUM LACTATE, POTASSIUM CHLORIDE, AND CALCIUM CHLORIDE 5; .6; .31; .03; .02 G/100ML; G/100ML; G/100ML; G/100ML; G/100ML
INJECTION, SOLUTION INTRAVENOUS CONTINUOUS
Status: DISCONTINUED | OUTPATIENT
Start: 2017-01-14 | End: 2017-01-15

## 2017-01-14 RX ORDER — POTASSIUM CHLORIDE 29.8 MG/ML
20 INJECTION INTRAVENOUS
Status: DISCONTINUED | OUTPATIENT
Start: 2017-01-14 | End: 2017-01-16 | Stop reason: HOSPADM

## 2017-01-14 RX ORDER — DEXTROSE MONOHYDRATE 25 G/50ML
25-50 INJECTION, SOLUTION INTRAVENOUS
Status: DISCONTINUED | OUTPATIENT
Start: 2017-01-14 | End: 2017-01-16 | Stop reason: HOSPADM

## 2017-01-14 RX ORDER — PIPERACILLIN SODIUM, TAZOBACTAM SODIUM 4; .5 G/20ML; G/20ML
4.5 INJECTION, POWDER, LYOPHILIZED, FOR SOLUTION INTRAVENOUS EVERY 6 HOURS
Status: DISCONTINUED | OUTPATIENT
Start: 2017-01-14 | End: 2017-01-16

## 2017-01-14 RX ORDER — POTASSIUM CHLORIDE 750 MG/1
20-40 TABLET, EXTENDED RELEASE ORAL
Status: DISCONTINUED | OUTPATIENT
Start: 2017-01-14 | End: 2017-01-16 | Stop reason: HOSPADM

## 2017-01-14 RX ADMIN — VANCOMYCIN HYDROCHLORIDE 1500 MG: 10 INJECTION, POWDER, LYOPHILIZED, FOR SOLUTION INTRAVENOUS at 17:30

## 2017-01-14 RX ADMIN — PIPERACILLIN AND TAZOBACTAM 4.5 G: 4; .5 INJECTION, POWDER, FOR SOLUTION INTRAVENOUS at 12:11

## 2017-01-14 RX ADMIN — PIPERACILLIN AND TAZOBACTAM 4.5 G: 4; .5 INJECTION, POWDER, FOR SOLUTION INTRAVENOUS at 06:07

## 2017-01-14 RX ADMIN — PIPERACILLIN AND TAZOBACTAM 4.5 G: 4; .5 INJECTION, POWDER, FOR SOLUTION INTRAVENOUS at 23:53

## 2017-01-14 RX ADMIN — POTASSIUM PHOSPHATE, MONOBASIC AND POTASSIUM PHOSPHATE, DIBASIC 25 MMOL: 224; 236 INJECTION, SOLUTION INTRAVENOUS at 08:28

## 2017-01-14 RX ADMIN — DEXTROSE MONOHYDRATE AND SODIUM CHLORIDE: 5; .45 INJECTION, SOLUTION INTRAVENOUS at 03:03

## 2017-01-14 RX ADMIN — SODIUM BICARBONATE: 84 INJECTION, SOLUTION INTRAVENOUS at 05:04

## 2017-01-14 RX ADMIN — SODIUM CHLORIDE 1000 ML: 9 INJECTION, SOLUTION INTRAVENOUS at 00:13

## 2017-01-14 RX ADMIN — HEPARIN SODIUM 5000 UNITS: 5000 INJECTION, SOLUTION INTRAVENOUS; SUBCUTANEOUS at 12:11

## 2017-01-14 RX ADMIN — POTASSIUM CHLORIDE 20 MEQ: 29.8 INJECTION, SOLUTION INTRAVENOUS at 04:58

## 2017-01-14 RX ADMIN — SODIUM CHLORIDE, SODIUM LACTATE, POTASSIUM CHLORIDE, CALCIUM CHLORIDE AND DEXTROSE MONOHYDRATE: 5; 600; 310; 30; 20 INJECTION, SOLUTION INTRAVENOUS at 16:41

## 2017-01-14 RX ADMIN — HEPARIN SODIUM 5000 UNITS: 5000 INJECTION, SOLUTION INTRAVENOUS; SUBCUTANEOUS at 20:49

## 2017-01-14 RX ADMIN — HEPARIN SODIUM 5000 UNITS: 5000 INJECTION, SOLUTION INTRAVENOUS; SUBCUTANEOUS at 03:21

## 2017-01-14 RX ADMIN — SODIUM BICARBONATE: 84 INJECTION, SOLUTION INTRAVENOUS at 13:07

## 2017-01-14 RX ADMIN — HYDROMORPHONE HYDROCHLORIDE 0.2 MG: 10 INJECTION, SOLUTION INTRAMUSCULAR; INTRAVENOUS; SUBCUTANEOUS at 00:43

## 2017-01-14 RX ADMIN — Medication 3 UNITS/HR: at 04:03

## 2017-01-14 RX ADMIN — Medication 2 UNITS/HR: at 22:10

## 2017-01-14 RX ADMIN — SODIUM CHLORIDE 1000 ML: 9 INJECTION, SOLUTION INTRAVENOUS at 02:45

## 2017-01-14 RX ADMIN — POTASSIUM PHOSPHATE, MONOBASIC AND POTASSIUM PHOSPHATE, DIBASIC 25 MMOL: 224; 236 INJECTION, SOLUTION INTRAVENOUS at 15:13

## 2017-01-14 RX ADMIN — Medication 2 G: at 04:49

## 2017-01-14 RX ADMIN — VANCOMYCIN HYDROCHLORIDE 1500 MG: 10 INJECTION, POWDER, LYOPHILIZED, FOR SOLUTION INTRAVENOUS at 09:14

## 2017-01-14 RX ADMIN — POTASSIUM CHLORIDE 20 MEQ: 29.8 INJECTION, SOLUTION INTRAVENOUS at 06:07

## 2017-01-14 RX ADMIN — POTASSIUM CHLORIDE 20 MEQ: 1.5 POWDER, FOR SOLUTION ORAL at 22:10

## 2017-01-14 RX ADMIN — SODIUM BICARBONATE 50 MEQ: 84 INJECTION, SOLUTION INTRAVENOUS at 00:13

## 2017-01-14 RX ADMIN — Medication 6 UNITS/HR: at 11:02

## 2017-01-14 RX ADMIN — SODIUM CHLORIDE: 9 INJECTION, SOLUTION INTRAVENOUS at 01:29

## 2017-01-14 RX ADMIN — ACETAMINOPHEN 650 MG: 325 TABLET, FILM COATED ORAL at 18:16

## 2017-01-14 RX ADMIN — PIPERACILLIN AND TAZOBACTAM 4.5 G: 4; .5 INJECTION, POWDER, FOR SOLUTION INTRAVENOUS at 16:48

## 2017-01-14 RX ADMIN — POTASSIUM CHLORIDE 40 MEQ: 750 TABLET, EXTENDED RELEASE ORAL at 19:53

## 2017-01-14 ASSESSMENT — ACTIVITIES OF DAILY LIVING (ADL)
FALL_HISTORY_WITHIN_LAST_SIX_MONTHS: NO
AMBULATION: 0-->INDEPENDENT
TOILETING: 0-->INDEPENDENT
BATHING: 0-->INDEPENDENT
COGNITION: 0 - NO COGNITION ISSUES REPORTED
DRESS: 0-->INDEPENDENT
RETIRED_EATING: 0-->INDEPENDENT
TRANSFERRING: 0-->INDEPENDENT
WHICH_OF_THE_ABOVE_FUNCTIONAL_RISKS_HAD_A_RECENT_ONSET_OR_CHANGE?: COGNITION
SWALLOWING: 0-->SWALLOWS FOODS/LIQUIDS WITHOUT DIFFICULTY
RETIRED_COMMUNICATION: 0-->UNDERSTANDS/COMMUNICATES WITHOUT DIFFICULTY

## 2017-01-14 NOTE — TELEPHONE ENCOUNTER
"Call Type: Triage Call    Presenting Problem: Patient is calling and mother states patient is\"  lethargic,\" has \"fruity breath\" and \"sleeping throughout the day.\"  Blood sugars have been running high the low throughout the day.  Triaged for diabetes:control problems/Disposition to see ED  immediately.  Triage Note:  Guideline Title: Diabetes: Control Problems  Recommended Disposition: See ED Immediately  Original Inclination: Wanted to speak with a nurse  Override Disposition:  Intended Action: Go to Hospital / ED  Physician Contacted: No  Signs and symptoms of ketoacidosis AND blood sugar more than 300 mg/dl ?  YES  Unconscious now ? NO  New or worsening signs and symptoms that may indicate shock ? NO  New seizure now or within last 6 hours ? NO  Any signs/symptoms of severe hypoglycemia or blood sugar less than 55 mg/dl AND  unable to follow personal action plan or continues 20 minutes after treatment ?  NO  Known or suspected intentional overdose of insulin or oral hypoglycemic drugs ? NO  Known or suspected accidental overdose of rapid or short acting insulin or oral  hypoglycemic drugs ? NO  Known or suspected accidental overdose of intermediate or long acting insulin or  oral hypoglycemic drugs ? NO  Physician Instructions:  Care Advice: Another adult should drive.  If available, bring recent log of blood sugars or bring blood glucose  monitor with log of blood sugars.  Dehydration can affect blood sugar levels.  Drink water during transport  and while waiting to see a provider.  If vomiting, take sips of water or  suck on ice chips.  When sitting, use a chair with arms to help protect from falling.  IMMEDIATE ACTION  Write down provider's name. List or place the following in a bag for  transport with the patient: current prescription and/or nonprescription  medications  alternative treatments, therapies and medications  and street drugs.  Call  if any loss of consciousness,  difficult to awaken, slow " to  respond, or new onset of confused thinking.

## 2017-01-14 NOTE — PLAN OF CARE
Problem: Goal Outcome Summary  Goal: Goal Outcome Summary  Outcome: Improving  D: Admitted for DKA  I/A: Patient lethargic but does follow commands. Tachycardic, BP stable, afebrile. Room air, 100%. [place]- good UOP. Insulin gtt- bs 200s, bicarb gtt, D5 1/2NS gtt.  Electrolytes replaced per protocol. Will continue to monitor  P: Continue POC

## 2017-01-14 NOTE — H&P
"               Harsha Delgadillo MRN# [id number]   Age: 21 year old YOB: 1995     Date of Admission:  1/13/2017          History of Present Illness:     Ms. Delgadillo is a 21 year old female with PMH of DM type I, history of DKA and intubation; who presents for high sugars at home.     Patient presented with her mother to the ED for blood glucose in 700s. Per report she did take insulin at home. Unclear how much, but this was apparently witnessed by her mother. Has had some back pain. No nausea, vomiting, or diarrhea. No fevers or chills. \"this is what she is like when she's in DKA\" per her mother.  Mother says \"this is typical for her when she is very ill.\" HPI is largely per chart review as patient confused. Alert but responses not appropriate.     In the ED, blood glucose, 270. Ph 6.92.  Bicarb 9. AG 23. ketones 4.2, Cr 1.45. She was given IVF boluses, started on insluin gtt, given bicarb amps, then ultimately a bicarb gtt as well. CXR clear. CT abd done and prelim read unrevealing.        Past Medical History:     Past Medical History   Diagnosis Date     Diabetes (H)      Depressive disorder            Past Surgical History:     Past Surgical History   Procedure Laterality Date     No           Allergies:    No Known Allergies     Home Medications:     Prescriptions prior to admission   Medication Sig Dispense Refill Last Dose     insulin lispro (HUMALOG PENFILL) 100 UNIT/ML Cartridge For use with Luxura pen device. (patient has pen device already). Use 1 unit: 7 grams of carbohydrate for meals and snacks. Use correction scale per instructions. 15 mL 3 1/13/2017 at Unknown time     blood glucose monitoring (ACCU-CHEK BUNNY PLUS) test strip Use to test blood sugar 6-8 times daily or as directed.  Ok to substitute alternative if insurance prefers. 200 each 12 1/13/2017 at Unknown time     blood glucose monitoring (ACCU-CHEK FASTCLIX) lancets Use to test blood sugar 6-8 times daily or as directed.  " "Ok to substitute alternative if insurance prefers. 1 Box prn 1/13/2017 at Unknown time     insulin glargine (LANTUS SOLOSTAR) 100 UNIT/ML PEN Inject 40 Units Subcutaneous daily (with dinner) 15 mL 3 1/13/2017 at Unknown time     FLUoxetine (PROZAC) 20 MG capsule Take 1 capsule (20 mg) by mouth daily 30 capsule 2 1/13/2017 at Unknown time     acetone, Urine, test STRP    Taking     Soap & Cleansers (MASTISOL LIQUID ADHESIVE EX)    Taking     Insulin Pen Needle (PEN NEEDLES 3/16\") 31G X 5 MM MISC    Taking     glucagon 1 MG injection Inject 1 mg into the muscle once for 1 dose 1 mg 1           Family History:     Family History   Problem Relation Age of Onset     DIABETES Paternal Grandmother           Social History:     Social History     Social History     Marital Status: Single     Spouse Name: N/A     Number of Children: N/A     Years of Education: N/A     Occupational History     Not on file.     Social History Main Topics     Smoking status: Never Smoker      Smokeless tobacco: Not on file     Alcohol Use: No     Drug Use: Not on file     Sexual Activity: Not Currently     Other Topics Concern     Not on file     Social History Narrative     Per chart review         Review of Systems:   10 point ROS was unable to be performed given altered mentation.           Physical Exam:   Vitals:  Temp:  [98.5  F (36.9  C)-98.9  F (37.2  C)] 98.9  F (37.2  C)  Heart Rate:  [116-127] 117  Resp:  [14-34] 16  BP: (107-149)/() 107/69 mmHg  SpO2:  [99 %-100 %] 100 %    Wt Readings from Last 4 Encounters:   01/14/17 69.5 kg (153 lb 3.5 oz)   12/15/16 76.522 kg (168 lb 11.2 oz)   10/06/16 78.472 kg (173 lb)       Intake/Output Summary (Last 24 hours) at 01/14/17 0448  Last data filed at 01/14/17 0400   Gross per 24 hour   Intake 2164.42 ml   Output   1750 ml   Net 414.42 ml       Gen: alert, young female, appears restless, ill appearting  HEENT: pupils equal, reactive, MM dry, ketones on breath  Resp: clear lungs " bilaterally, no wheezing or crackles  CV: tachycardic, s1/s2, no m/r/g appreciated  Abdominal: Soft; NT, ND; NABS  /Rectal: Not examined.   Skin: No rashes or cyanosis; otherwise appropriate for age and ethnicity.  Extremities: wwp, no edema in BLE  Neurological: alert, oriented to person, speech is normal, face symmetric, moving all extremities spontaneously  Drains and Tubes: mishra  Intravascular Access and Device: mid line and PIV           Laboratory Data:   ROUTINE ICU LABS (Last four results)  CMP  Recent Labs  Lab 01/14/17 0316 01/14/17  0032 01/13/17 2039 01/13/17 2038   *  --  140  --    POTASSIUM 3.3* 3.9 5.6*  --    CHLORIDE 114*  --  108  --    CO2 14*  --  9*  --    ANIONGAP 19*  --  23*  --    *  --  294*  --    BUN 21  --  27  --    CR 1.19*  --  1.45*  --    GFRESTIMATED 57*  --  45* 47*   GFRESTBLACK 69  --  55* 57*   NYDIA 6.8*  --  8.5  --    MAG 1.5*  --  2.2  --    PHOS  --   --  5.3*  --    PROTTOTAL 5.5*  --  7.9  --    ALBUMIN 2.5*  --  3.5  --    BILITOTAL 0.4  --  0.3  --    ALKPHOS 89  --  128  --    AST 15  --  22  --    ALT 15  --  28  --      CBC  Recent Labs  Lab 01/14/17 0316 01/13/17 2039   WBC 20.4* 28.1*   RBC 3.77* 4.54   HGB 11.2* 13.8   HCT 34.0* 43.5   MCV 90 96   MCH 29.7 30.4   MCHC 32.9 31.7   RDW 14.1 14.4    357     Arterial Blood Gas  Recent Labs  Lab 01/14/17  0405 01/13/17  2350   PH 7.32*  --    PCO2 25*  --    PO2 97  --    HCO3 13*  --    O2PER 21.0 21     Ketone 4.2  Lactate 2.9    Culture data:    UA: nitrites and LE negative, > 1000 glucose, ketones > 150         Imaging:     CXR: clear    Abd CT: gastric distension, otherwise negative per prelim report         Assessment and Plan:              Ms. Delgadillo is a 21 year old F with PMH of type I DM and DKA; who presents with high blood sugars. Found to be in DKA; admitted to ICU for further cares.     Neuro:    # Altered Mental status: likely secondary to metabolic etiology. Per patient's  mother, gets very confused with DKA episodes in the past. Also consider infectious causes. Does have leukocytosis. UA and CXR negative. Blood cultures drawn. WBC and procalcitonin elevated  --Continued treatment of DKA  --Monitor cultures  --please see ID section below    CV:    ## Tachycardia: likely due to volume depletion in setting of DKA  Received at least 3L in ED. Control fluids as per protocol    Pulm:    ## Respiratory alkalosis: compensatory in setting of severe metabolic acidosis 2/2 DKA.     GI:    ## No acute issues    Renal/FEN:    ## MARY ELLEN: hypovolemic in setting of DKA. UA > 1000 glucose. Cr 1.45 on admission --> 1.19 with fluids  --Cont fluids as per protocol  --avoid nephrotoxins  --Trend BMP    ## Anion gap metabolic acidosis. Initial pH of 6.92 on VBG. Bicarb 9 and Anion gap of 23. Lactate 2.9 Given IVF boluses, amps of bicarb and started on drip in the ED.  Improving with DKA protocol.   --Fluids as per protocol  --Continue q2 labs for now  --Stop bicarb drip as pH > 7    ##Low K, Mg, and Phos: on admission k 5.6, Mg 2.2, and phos 5.3  stat recheck once patient arrived on the floor revealed dramatic drop K to 3.3 , Mg 1.5 , and Phos 0.6.   --Started on high dose relacements      Endo:    ## DKA:   ## DM type I:  Patient presenting with hyperglycemia at home. 600-700. Took unknown amount of insulin. Glucose 270, pH 6.92, bicarbonate 9, ketones 4.2 on admission. Unclear if missed insulin doses or consider infectious etiology. Started on insulin and bicarb drisp in ED. PH much improved.   --Continue insulin gtt and IVFs per protocol  --Trend labs q2  --aggressive electrolyte replacement    ID:    ## Leukocytosis: 28 on admission. No localizing signs or symptoms. Afebrile. UA and CXR negative. Cultures done. Checked a procal which was elevated at 3. Given severe DKA and unable to give good history, will opt to start on broad spectrum antibiotics.   --Blood and urine cultures  --Pip/tazo and vanc  (start 1/14) likely can narrow or  discontinue rapidly    Heme:    ## Leukocytosis as noted above    Skin:    No acute issues    Summary  Feeding: NPO, IVFs  Thrombo-propholaxis: heparin   Code Status: FULL    Dispo: ICU for ongoing cares of severe DKA      Mavis Avilez  PGY-3 IM  [phone]

## 2017-01-14 NOTE — PHARMACY-ADMISSION MEDICATION HISTORY
Admission medication history interview status for the 1/13/2017 admission is complete. See Epic admission navigator for allergy information, pharmacy, prior to admission medications and immunization status.     Medication history interview sources:  Patient, [id number] ([phone])    Changes made to PTA medication list (reason)  Added: None  Deleted: None  Changed: None    Additional medication history information (including reliability of information, actions taken by pharmacist):  Patient was a poor historian. She recognized Prozac, but was unable to verify the dose or date of the last dose. She confirmed she uses insulin, but was unable to confirm type of insulin or last dose.  [facility] was contacted to confirm medications because it was listed as the pharmacy  her chart. [facility] confirmed the Prozac, Lantus pen, and Humalog pen doses and fills. Patient appears poorly compliant with Prozac dosing regimen due to fill history at [facility] (filled Nov. 13th for quantity of 30, then Dec. 26th for quantity of 30).  Patient stated she has not received the flu shot this year.    Prior to Admission medications    Medication Sig Last Dose Taking? Auth Provider   insulin lispro (HUMALOG PENFILL) 100 UNIT/ML Cartridge For use with Luxura pen device. (patient has pen device already). Use 1 unit: 7 grams of carbohydrate for meals and snacks. Use correction scale per instructions.  Yes Roberto Bland MD   insulin glargine (LANTUS SOLOSTAR) 100 UNIT/ML PEN Inject 40 Units Subcutaneous daily (with dinner)  Yes Roberto Bland MD   FLUoxetine (PROZAC) 20 MG capsule Take 1 capsule (20 mg) by mouth daily  Yes Roberto Bland MD   blood glucose monitoring (ACCU-CHEK BUNNY PLUS) test strip Use to test blood sugar 6-8 times daily or as directed.  Ok to substitute alternative if insurance prefers.   Roberto Bland MD   blood glucose monitoring (ACCU-CHEK FASTCLIX) lancets Use to test  "blood sugar 6-8 times daily or as directed.  Ok to substitute alternative if insurance prefers.   Roberto Bland MD   acetone, Urine, test STRP    Reported, Patient   Soap & Cleansers (MASTISOL LIQUID ADHESIVE EX)    Reported, Patient   Insulin Pen Needle (PEN NEEDLES 3/16\") 31G X 5 MM MISC    Reported, Patient   glucagon 1 MG injection Inject 1 mg into the muscle once for 1 dose   Roberto Bland MD             Medication history completed by: Cathie Richardson  "

## 2017-01-14 NOTE — ED PROVIDER NOTES
"  History     Chief Complaint   Patient presents with     Hyperglycemia     HPI  Harsha Delgadillo is a 21 year old female with a history of type 1 diabetes mellitus who presents to the Emergency Department with hyperglycemia. Patient states that she is here because her mother thinks she is hyperglycemic. The patient reports that her sugars have been in the 700's but have been slowly decreasing. She first noticed this at 11:00 AM today. Patient self-administers insulin. She last gave herself this at about 3:00 PM today, this was witnessed by her mother. She reports that she has had back pain. Patient denies vomiting and diarrhea. She denies any chest pain. No dysuria or hematuria.     Patient was diagnosed with IDDM  at age 9 (12 years ago). She has been hospitalized 4 times in the past 7 years with DKA. She has been intubated once because of the DKA but has been in a coma two times for about a week. This occurred at [facility] in [place].     History is provided by the patient and her mother. History provided by the patient is limited secondary to altered mental status.     Mother reports that \"this is what she is like when she's in DKA\".  She is quite confused and mom says this is typical for her when she is very ill.    I have reviewed the Medications, Allergies, Past Medical and Surgical History, and Social History in the [employer] system.    PAST MEDICAL HISTORY  Past Medical History   Diagnosis Date     Diabetes (H)      Depressive disorder      PAST SURGICAL HISTORY  Past Surgical History   Procedure Laterality Date     No       FAMILY HISTORY  Family History   Problem Relation Age of Onset     DIABETES Paternal Grandmother      SOCIAL HISTORY  Social History   Substance Use Topics     Smoking status: Never Smoker      Smokeless tobacco: Not on file     Alcohol Use: No     MEDICATIONS  Current Facility-Administered Medications   Medication     naloxone (NARCAN) injection 0.1-0.4 mg     heparin sodium PF " injection 5,000 Units     0.9% sodium chloride BOLUS     glucose 40 % gel 15-30 g    Or     dextrose 50 % injection 25-50 mL    Or     glucagon injection 1 mg     Patient is already receiving anticoagulation with heparin, enoxaparin (LOVENOX), warfarin (COUMADIN)  or other anticoagulant medication     0.45% sodium chloride infusion     dextrose 5% and 0.45% NaCl 1,000 mL infusion     insulin 1 unit/1mL in saline (NovoLIN-Regular) drip - DKA algorithm     sodium bicarbonate 150 mEq in 5% dextrose for infusion     lidocaine 1 % 1 mL     lidocaine (LMX4) kit     sodium chloride (PF) 0.9% PF flush 3 mL     sodium chloride (PF) 0.9% PF flush 3 mL     dextrose 5% and 0.45% NaCl infusion     dextrose 50 % injection 25-50 mL     insulin 1 unit/1 mL in NS (NovoLIN-regular) drip -ED DKA algorithm     ALLERGIES  No Known Allergies     Review of Systems   Cardiovascular: Negative for chest pain.   Gastrointestinal: Negative for vomiting and diarrhea.   Genitourinary: Negative for dysuria and hematuria.   Musculoskeletal: Positive for back pain.   Psychiatric/Behavioral: Positive for confusion.   All other systems reviewed and are negative.      Physical Exam   BP: (!) 132/92 mmHg  Heart Rate: 126  Temp: 98.5  F (36.9  C)  Resp: (!) 32  Weight: 72.576 kg (160 lb)  SpO2: 100 %  Physical Exam   Constitutional: She appears well-developed and well-nourished. She appears distressed.   Very ill/toxic appearing.  Hyperpnea and tachypnea noted. Confused, unable to give coherent answers to questions. Appears to be hallucinating at times. Smells somewhat of feculent matter.   HENT:   Head: Normocephalic and atraumatic.   Eyes: Conjunctivae and EOM are normal. Pupils are equal, round, and reactive to light.   Cardiovascular: Regular rhythm, normal heart sounds and intact distal pulses.    tachycardic   Pulmonary/Chest: Effort normal and breath sounds normal. No respiratory distress. She has no wheezes. She has no rales.   Tachypneic,  hyperpneic   Abdominal: Soft. Bowel sounds are normal. She exhibits no distension. There is no tenderness.   Musculoskeletal: She exhibits no edema.   Neurological:   Confused, appears to be hallucinating at times   Skin: She is not diaphoretic.   cool   Nursing note and vitals reviewed.      ED Course   Procedures         8:49 PM The patient was seen and evaluated in the Emergency Department in room UU04/ED04        Critical Care time:  was 150 minutes for this patient excluding procedures.     Lactate is greater than 2 due to DKA, at this time there is no sign of sepsis.         Results for orders placed or performed during the hospital encounter of 01/13/17 (from the past 24 hour(s))   EKG 12-lead, tracing only   Result Value Ref Range    Interpretation ECG Click View Image link to view waveform and result    Creatinine POCT   Result Value Ref Range    Creatinine 1.4 (H) 0.52 - 1.04 mg/dL    GFR Estimate 47 (L) >60 mL/min/1.7m2    GFR Estimate If Black 57 (L) >60 mL/min/1.7m2   Glucose by meter   Result Value Ref Range    Glucose 270 (H) 70 - 99 mg/dL   CBC with platelets differential   Result Value Ref Range    WBC 28.1 (H) 4.0 - 11.0 10e9/L    RBC Count 4.54 3.8 - 5.2 10e12/L    Hemoglobin 13.8 11.7 - 15.7 g/dL    Hematocrit 43.5 35.0 - 47.0 %    MCV 96 78 - 100 fl    MCH 30.4 26.5 - 33.0 pg    MCHC 31.7 31.5 - 36.5 g/dL    RDW 14.4 10.0 - 15.0 %    Platelet Count 357 150 - 450 10e9/L    Diff Method Automated Method     % Neutrophils 84.6 %    % Lymphocytes 6.7 %    % Monocytes 7.0 %    % Eosinophils 0.0 %    % Basophils 0.1 %    % Immature Granulocytes 1.6 %    Nucleated RBCs 0 0 /100    Absolute Neutrophil 23.8 (H) 1.6 - 8.3 10e9/L    Absolute Lymphocytes 1.9 0.8 - 5.3 10e9/L    Absolute Monocytes 2.0 (H) 0.0 - 1.3 10e9/L    Absolute Eosinophils 0.0 0.0 - 0.7 10e9/L    Absolute Basophils 0.0 0.0 - 0.2 10e9/L    Abs Immature Granulocytes 0.5 (H) 0 - 0.4 10e9/L    Absolute Nucleated RBC 0.0    Comprehensive  metabolic panel   Result Value Ref Range    Sodium 140 133 - 144 mmol/L    Potassium 5.6 (H) 3.4 - 5.3 mmol/L    Chloride 108 94 - 109 mmol/L    Carbon Dioxide 9 (LL) 20 - 32 mmol/L    Anion Gap 23 (H) 3 - 14 mmol/L    Glucose 294 (H) 70 - 99 mg/dL    Urea Nitrogen 27 7 - 30 mg/dL    Creatinine 1.45 (H) 0.52 - 1.04 mg/dL    GFR Estimate 45 (L) >60 mL/min/1.7m2    GFR Estimate If Black 55 (L) >60 mL/min/1.7m2    Calcium 8.5 8.5 - 10.1 mg/dL    Bilirubin Total 0.3 0.2 - 1.3 mg/dL    Albumin 3.5 3.4 - 5.0 g/dL    Protein Total 7.9 6.8 - 8.8 g/dL    Alkaline Phosphatase 128 40 - 150 U/L    ALT 28 0 - 50 U/L    AST 22 0 - 45 U/L   Lactic acid   Result Value Ref Range    Lactic Acid 2.9 (H) 0.7 - 2.1 mmol/L   ISTAT gases lactate dante POCT   Result Value Ref Range    Ph Venous 6.92 (LL) 7.32 - 7.43 pH    PCO2 Venous 26 (L) 40 - 50 mm Hg    PO2 Venous 28 25 - 47 mm Hg    Bicarbonate Venous 5 (LL) 21 - 28 mmol/L    O2 Sat Venous 25 %    Lactic Acid 2.7 (H) 0.7 - 2.1 mmol/L   Hemoglobin A1c   Result Value Ref Range    Hemoglobin A1C 13.7 (H) 4.3 - 6.0 %   Magnesium   Result Value Ref Range    Magnesium 2.2 1.6 - 2.3 mg/dL   Phosphorus   Result Value Ref Range    Phosphorus 5.3 (H) 2.5 - 4.5 mg/dL   Salicylate level   Result Value Ref Range    Salicylate Level 4 mg/dL   Blood Culture ONE site   Result Value Ref Range    Specimen Description Blood Right Arm     Culture Micro Pending     Micro Report Status Pending    Ketone quantitative   Result Value Ref Range    Ketone Quantitative 4.2 (HH) 0.0 - 0.6 mmol/L   Glucose by meter   Result Value Ref Range    Glucose 287 (H) 70 - 99 mg/dL   Chest XR,  PA & LAT    Narrative    Exam:  XR CHEST 2 VW, 1/13/2017 10:19 PM    History: DKA, eval for infiltrate    Comparison:  None    Findings:  The cardiac silhouette and pulmonary vessels are within  normal limits. The lungs are clear. No pleural effusion or  pneumothorax. No acute bony abnormality. The visualized abdomen  is  unremarkable.      Impression    Impression: Clear lungs.    I have personally reviewed the examination and initial interpretation  and I agree with the findings.    GIOVANNI SNOW MD   HCG qualitative urine   Result Value Ref Range    HCG Qual Urine Negative NEG   UA with Microscopic reflex to Culture   Result Value Ref Range    Color Urine Light Yellow     Appearance Urine Slightly Cloudy     Glucose Urine >1000 (A) NEG mg/dL    Bilirubin Urine Negative NEG    Ketones Urine >150 (A) NEG mg/dL    Specific Gravity Urine 1.008 1.003 - 1.035    Blood Urine Trace (A) NEG    pH Urine 5.5 5.0 - 7.0 pH    Protein Albumin Urine 100 (A) NEG mg/dL    Urobilinogen mg/dL Normal 0.0 - 2.0 mg/dL    Nitrite Urine Negative NEG    Leukocyte Esterase Urine Negative NEG    Source Catheterized Urine     WBC Urine 13 (H) 0 - 2 /HPF    RBC Urine 2 0 - 2 /HPF    Squamous Epithelial /HPF Urine 1 0 - 1 /HPF   Glucose by meter   Result Value Ref Range    Glucose 378 (H) 70 - 99 mg/dL   Glucose by meter   Result Value Ref Range    Glucose 371 (H) 70 - 99 mg/dL   Blood gas venous   Result Value Ref Range    Ph Venous 7.04 (LL) 7.32 - 7.43 pH    PCO2 Venous 20 (L) 40 - 50 mm Hg    PO2 Venous 38 25 - 47 mm Hg    Bicarbonate Venous 5 (LL) 21 - 28 mmol/L    Base Deficit Venous 23.6 mmol/L    FIO2 21    Glucose by meter   Result Value Ref Range    Glucose 257 (H) 70 - 99 mg/dL   Potassium   Result Value Ref Range    Potassium 3.9 3.4 - 5.3 mmol/L   CT Abdomen Pelvis w/o Contrast    Narrative    Resident preliminary report: Moderate gastric distention, otherwise  unremarkable CT of the abdomen and pelvis.       Glucose by meter   Result Value Ref Range    Glucose 201 (H) 70 - 99 mg/dL   Glucose by meter   Result Value Ref Range    Glucose 171 (H) 70 - 99 mg/dL         Assessments & Plan (with Medical Decision Making)   This is a 21-year-old type I diabetic who presents to the emergency department today with symptoms consistent with diabetic  ketoacidosis.  Patient has a prior history of being hospitalized multiple times for DKA, recently moved here from [place] so those hospitalizations have not been in our system.  Mother reports that her blood sugar has been  high  all day long, she started to get confused and mom brought her in.  The patient is not coherent enough to give anything close to an accurate history, seems to be at times hallucinating here in the emergency department.  Mom reports this is what she acts like when she does have diabetic ketoacidosis.  On exam she is confused, tachypneic, smells ketotic, and is ill-appearing.  She is tachycardic in the 120 to 130 range.  Somewhat surprisingly Accu-Chek is 270.  However i-STAT pH is 6.92 with a bicarb of 5 and a PCO2 of 26.  Lactate is only 2.7, also a bit surprising.  CBC shows a white blood cell count of 28.1.  Comprehensive metabolic panel shows renal insufficiency with a creatinine of 1.45 (this double her last creatinine from about a month ago).  Potassium is high at 5.6, bicarb is low on the comprehensive metabolic panel with a bicarb of 9.  Anion gap is high at 23.  Glucose is 294 on the comprehensive panel.  Lactate is 2.9.  Hemoglobin A1c is still pending at this time.  Magnesium is normal at 2.2 and phosphorus is 5.3.  Serum ketones 4.2.  Blood culture was obtained.  UA shows 13 WBC and > 150 ketones and UPT negative.     Patient was given IV fluids here in the emergency department.  Due to her severe acidosis and a pH of 6.92 I did elect to start a bicarbonate infusion on her. 150 mEq of bicarb were placed in a liter of D5W and infused at 150 cc/hr.  I will speak to the ICU and we will admit her to the intensive care unit as soon as possible.  Insulin-drip has been ordered.    The patient was given IV fluids here in the Emergency Department and we did start a bicarb drip.  After I discussed the case with Dr. Palmer of the tele ICU service, we will give her one bolus of bicarb,  50 mEq IV bolus in addition to the bicarb drip.  A insulin drip was started per protocol.  We will place a Capone catheter to obtain urine and to closely monitor I s and O s.  Chest x-ray was done and this was read by radiology as clear without any evidence of acute infiltrate.     Repeat VBG still shows bicarb of 5 after bolus and bicarb drip, though pH improved slightly to 7.04. Additional bicarb bolus ordered. Additional fluids ordered.  RN called me in to evaluated patient again (re-evaluated at multiple times during ED stay) due to constant shaking/? Rigors.  She has full body shivering/shaking. She will wake up and tell me that she is in the hospital for her hyperglycemia, so she is as oriented as she has been the entire time she has been here.  Do not suspect seizure.  Not making progress with severe acidosis despite bicarb drip and bicarb boluses. Added on salicylates to labs.  WE then ordered abd CT without contrast to evaluate for any acute intraabdominal process which might cause this severe acidosis.      Patient was discussed with tele ICU staff, Dr. Palmer and the ICU resident on multiple occasions.  Admitted to ICU.       This part of the document was transcribed by Monique Love Medical Scribe.       I have reviewed the nursing notes.    I have reviewed the findings, diagnosis, plan and need for follow up with the patient.    Current Discharge Medication List          Final diagnoses:   Diabetic ketoacidosis without coma associated with type 1 diabetes mellitus (H)   Delirium   Renal insufficiency   Hyperkalemia   IErnie, am serving as a trained medical scribe to document services personally performed by Ronit Melo MD, based on the provider's statements to me.      Ronit YEUNG MD, was physically present and have reviewed and verified the accuracy of this note documented by Ernie Kwon.      1/13/2017   [facility], [place], EMERGENCY DEPARTMENT      Ronit Melo,  MD  01/14/17 0242

## 2017-01-14 NOTE — PLAN OF CARE
Problem: Goal Outcome Summary  Goal: Goal Outcome Summary    Pt status is improving this afternoon. VS stable, HR is lower but still tachy at 108 (no ectopy). Insulin gtt, Algorithm # 2. Checking blood glucose hourly and lytes every 2 hours. Replacing potassium and phos as needed. Currently  K=3.3, Phos=2.2, Mg=2.0. Ketone=5.6. Bicarb gtt at 50 ml/hr and D5LR at 100 ml/hr. Pt is more awake this afternoon. Pt's mother was here and has been updated. Pt remains NPO, ice chips ok.

## 2017-01-14 NOTE — ED NOTES
Pt arrived from home via EMS with her mother in DKA. Pt is lethargic, confused, RR 30s, HR 120s, afebrile. Pt confused on arrival, alert.

## 2017-01-14 NOTE — PROGRESS NOTES
[facility], [place]   Intensive Care Daily Progress Note          Assessment and Plan:   20 y/o F with history of depression and DMI (poorly compliant, A1c 13) with pervious admissions for DKA, admitted through the ED on 1/13 for hyperglycemia to 700 at home.  In ED found to have pH of 6.9/bicarb 9, also found to have leukocytosis to 28K and elevated procalcitonin to 3.3.  Presentation consistent with DKA likely due to occult infection.    Neuro:  Awake/alert, confusion 2/2 acidosis, hypovolemia  - continue to treat underlying DKA    Pulmonary:  On room air, no tachypnea, no dyspnea, no cough, clear CXR  - continue to monitor closely for hypoventilation in setting of hypophosphatemia  - encourage IS usage    Cardiovascular:  Sinus tachycardia 2/2 hypovolemia  - continue fluid resuscitation and correction of DKA    ID:  Leukocytosis to 20 this a.m. And elevated procalcitonin, blood cx pending, urine cx pending, no sputum production.  CXR clear, CT abd/pelvis clear.  Unclear source of infection  - continue empiric vanco/zosyn  - follow-up culture results    GI/FEN:  DKA with hypovolemia, hypophosphatemia, hypokalemia, hypomagnesemia, wide anion gap acidosis.  Bicarb gtt dcd this a.m.  - start d5LR @150cc/hr  - restart bicarb gtt until bicarb is >20  - continue mg/phos/K electrolyte replacement protocols  - Q2H BMP, Q4 hr Mg/Phos  - NPO until acidosis and hypophosphatemia corrected  - continue insulin gtt    Renal:  MARY ELLEN, likely pre-renal in setting of hypovolemia  - continue IVF resuscitation    Endocrine: DKA, h/o DMI (A1c 13)  - continue sinsulin gtt  - Endocrine consulted for assistance in long term management of poorly controlled DMI  - SW consult for assistance with adjusting to medical illness    Prophylaxis:  - heparin sq 5000 q8h    Disposition:  ICU / IFCC          Interval History:   Remains dependent on the insulin gtt, denies pain this a.m., denies any recent cough or chest  pain, denies any dysuria or urgency over last few days.  Denies headache/nausea.           Medications:     Current Facility-Administered Medications   Medication     naloxone (NARCAN) injection 0.1-0.4 mg     heparin sodium PF injection 5,000 Units     glucose 40 % gel 15-30 g    Or     dextrose 50 % injection 25-50 mL    Or     glucagon injection 1 mg     Patient is already receiving anticoagulation with heparin, enoxaparin (LOVENOX), warfarin (COUMADIN)  or other anticoagulant medication     dextrose 5% and 0.45% NaCl 1,000 mL infusion     insulin 1 unit/1mL in saline (NovoLIN-Regular) drip - DKA algorithm     potassium chloride SA (K-DUR/KLOR-CON M) CR tablet 20-40 mEq     potassium chloride (KLOR-CON) Packet 20-40 mEq     potassium chloride 10 mEq in 100 mL intermittent infusion     potassium chloride 10 mEq in 100 mL intermittent infusion with 10 mg lidocaine     potassium chloride 20 mEq in 50 mL intermittent infusion     potassium phosphate 10 mmol in D5W 250 mL intermittent infusion     potassium phosphate 15 mmol in D5W 250 mL intermittent infusion     potassium phosphate 20 mmol in D5W 500 mL intermittent infusion     potassium phosphate 20 mmol in D5W 250 mL intermittent infusion     potassium phosphate 25 mmol in D5W 500 mL intermittent infusion     piperacillin-tazobactam (ZOSYN) 4.5 g vial to attach to  mL bag     vancomycin (VANCOCIN) 1,500 mg in NaCl 0.9 % 250 mL intermittent infusion     magnesium sulfate 2 g in NS intermittent infusion ([employer]ar[employer] or [facility]V Cmpd)     magnesium sulfate 4 g in 100 mL sterile water (premade)     lidocaine 1 % 1 mL     lidocaine (LMX4) kit     sodium chloride (PF) 0.9% PF flush 3 mL     sodium chloride (PF) 0.9% PF flush 3 mL     dextrose 5% and 0.45% NaCl infusion             Physical Exam:   Patient Vitals for the past 24 hrs:   BP Temp Temp src Heart Rate Resp SpO2 Weight   01/14/17 0900 105/69 mmHg - - 115 18 100 % -   01/14/17 0800 109/63 mmHg 99  F (37.2  C)  Oral 121 23 100 % -   01/14/17 0700 109/66 mmHg - - 114 8 100 % -   01/14/17 0600 106/61 mmHg - - 120 15 99 % -   01/14/17 0500 110/66 mmHg - - 116 16 100 % -   01/14/17 0400 107/69 mmHg - - 117 16 100 % -   01/14/17 0300 119/81 mmHg - - 122 14 100 % -   01/14/17 0200 118/76 mmHg 98.9  F (37.2  C) Oral 116 14 100 % 69.5 kg (153 lb 3.5 oz)   01/14/17 0145 119/78 mmHg - - 118 14 100 % -   01/14/17 0130 117/78 mmHg - - 117 15 100 % -   01/14/17 0115 124/88 mmHg - - 116 14 100 % -   01/14/17 0102 123/87 mmHg - - 117 15 99 % -   01/14/17 0030 (!) 134/91 mmHg - - 122 21 100 % -   01/14/17 0015 (!) 124/99 mmHg - - 124 26 99 % -   01/14/17 0000 (!) 140/95 mmHg - - 124 28 100 % -   01/13/17 2345 (!) 135/93 mmHg - - 121 (!) 31 100 % -   01/13/17 2330 (!) 138/91 mmHg - - 119 25 100 % -   01/13/17 2315 (!) 133/91 mmHg - - 122 25 100 % -   01/13/17 2300 (!) 137/91 mmHg - - 121 27 100 % -   01/13/17 2245 (!) 138/93 mmHg - - 124 (!) 31 100 % -   01/13/17 2230 (!) 132/102 mmHg - - 123 (!) 34 - -   01/13/17 2215 (!) 149/99 mmHg - - 122 30 100 % -   01/13/17 2145 (!) 134/93 mmHg - - 122 29 100 % -   01/13/17 2130 (!) 130/95 mmHg - - 127 30 100 % -   01/13/17 2045 130/89 mmHg - - 122 27 100 % -   01/13/17 2028 (!) 132/92 mmHg 98.5  F (36.9  C) Oral 126 (!) 32 100 % 72.576 kg (160 lb)       Intake/Output Summary (Last 24 hours) at 01/14/17 0928  Last data filed at 01/14/17 0900   Gross per 24 hour   Intake 3532.09 ml   Output   2925 ml   Net 607.09 ml       General Appearance: awake/sleepy, comfortable  Neuro: Alert and oriented x 3  HEENT: Head is atraumatic  Trachea is midline  Cardiovascular: tachycardia, regular rhythm  Respiratory: non-labored breathing on room air  Abdomen: soft, non-tender, non-distended  Extremities and Skin: warm, no rashes, palpable distal pulses  Drains and Tubes: Capone  Intravascular Access and Device: peripheral IV         Data:   All laboratory data reviewed  Lab Results   Component Value Date    PH 7.32*  01/14/2017    PO2 97 01/14/2017    PCO2 25* 01/14/2017    HCO3 13* 01/14/2017          NA      143   1/14/2017 CHLORIDE      111   1/14/2017 BUN       18   1/14/2017   POTASSIUM      3.8   1/14/2017 CO2       12   1/14/2017 CR     1.07   1/14/2017     Lab Results   Component Value Date    WBC 20.4* 01/14/2017    HGB 11.2* 01/14/2017    HCT 34.0* 01/14/2017    MCV 90 01/14/2017     01/14/2017     Lab Results   Component Value Date     01/14/2017    POTASSIUM 3.8 01/14/2017    CHLORIDE 111* 01/14/2017    CO2 12* 01/14/2017    * 01/14/2017     All imaging studies reviewed by me.  Chest Xr,  Pa & Lat    1/13/2017  Exam:  XR CHEST 2 VW, 1/13/2017 10:19 PM History: DKA, eval for infiltrate Comparison:  None Findings:  The cardiac silhouette and pulmonary vessels are within normal limits. The lungs are clear. No pleural effusion or pneumothorax. No acute bony abnormality. The visualized abdomen is unremarkable.     1/13/2017  Impression: Clear lungs. I have personally reviewed the examination and initial interpretation and I agree with the findings. GIOVANNI SNOW MD    Ct Abdomen Pelvis W/o Contrast    1/14/2017  EXAMINATION: CT ABDOMEN PELVIS W/O CONTRAST, 1/14/2017 1:10 AM      1/14/2017  IMPRESSION: Moderate gastric distention and mild nonspecific bladder wall thickening with a Capone in place. Otherwise unremarkable noncontrast CT of the abdomen and pelvis.  I have personally reviewed the examination and initial interpretation and I agree with the findings. MD Pierre HAWKINS MD  Surgical Resident  [facility]  [phone]

## 2017-01-14 NOTE — PHARMACY-VANCOMYCIN DOSING SERVICE
Pharmacy Vancomycin Initial Note  Date of Service 2017  Patient's  1995  21 year old, female    Indication: MRSA and Sepsis    Current estimated CrCl = Estimated Creatinine Clearance: 67.8 mL/min (based on Cr of 1.19).    Creatinine for last 3 days  2017:  8:39 PM Creatinine 1.45 mg/dL*  2017:  3:16 AM Creatinine 1.19 mg/dL*    Recent Vancomycin Level(s) for last 3 days  No results found for requested labs within last 3 days.      Vancomycin IV Administrations (past 72 hours)      No vancomycin orders with administrations in past 72 hours.                Nephrotoxins and other renal medications (Future)    Start     Dose/Rate Route Frequency Ordered Stop    17 0600  vancomycin (VANCOCIN) 1,500 mg in NaCl 0.9 % 250 mL intermittent infusion      1,500 mg Intravenous EVERY 12 HOURS 17 0552      17 0545  piperacillin-tazobactam (ZOSYN) 4.5 g vial to attach to  mL bag      4.5 g  over 1 Hours Intravenous EVERY 6 HOURS 17 0544            Contrast Orders - past 72 hours     None                Plan:  1.  Start vancomycin  1500 mg IV q12h.   2.  Goal Trough Level: 15-20 mg/L   3.  Pharmacy will check trough levels as appropriate in 1-3 Days.    4. Serum creatinine levels will be ordered daily for the first week of therapy and at least twice weekly for subsequent weeks.    5. Glendo method utilized to dose vancomycin therapy: Method 1    Bryanna Cordero, Pharm.D.

## 2017-01-15 LAB
ANION GAP SERPL CALCULATED.3IONS-SCNC: 10 MMOL/L (ref 3–14)
ANION GAP SERPL CALCULATED.3IONS-SCNC: 11 MMOL/L (ref 3–14)
ANION GAP SERPL CALCULATED.3IONS-SCNC: 12 MMOL/L (ref 3–14)
ANION GAP SERPL CALCULATED.3IONS-SCNC: 13 MMOL/L (ref 3–14)
ANION GAP SERPL CALCULATED.3IONS-SCNC: 9 MMOL/L (ref 3–14)
BACTERIA SPEC CULT: NO GROWTH
BUN SERPL-MCNC: 5 MG/DL (ref 7–30)
BUN SERPL-MCNC: 8 MG/DL (ref 7–30)
BUN SERPL-MCNC: 8 MG/DL (ref 7–30)
BUN SERPL-MCNC: 9 MG/DL (ref 7–30)
BUN SERPL-MCNC: 9 MG/DL (ref 7–30)
CALCIUM SERPL-MCNC: 6.4 MG/DL (ref 8.5–10.1)
CALCIUM SERPL-MCNC: 6.9 MG/DL (ref 8.5–10.1)
CALCIUM SERPL-MCNC: 7.7 MG/DL (ref 8.5–10.1)
CALCIUM SERPL-MCNC: 8.2 MG/DL (ref 8.5–10.1)
CALCIUM SERPL-MCNC: 8.9 MG/DL (ref 8.5–10.1)
CHLORIDE SERPL-SCNC: 108 MMOL/L (ref 94–109)
CHLORIDE SERPL-SCNC: 108 MMOL/L (ref 94–109)
CHLORIDE SERPL-SCNC: 109 MMOL/L (ref 94–109)
CHLORIDE SERPL-SCNC: 110 MMOL/L (ref 94–109)
CHLORIDE SERPL-SCNC: 111 MMOL/L (ref 94–109)
CO2 SERPL-SCNC: 20 MMOL/L (ref 20–32)
CO2 SERPL-SCNC: 20 MMOL/L (ref 20–32)
CO2 SERPL-SCNC: 21 MMOL/L (ref 20–32)
CO2 SERPL-SCNC: 21 MMOL/L (ref 20–32)
CO2 SERPL-SCNC: 22 MMOL/L (ref 20–32)
CREAT SERPL-MCNC: 0.88 MG/DL (ref 0.52–1.04)
CREAT SERPL-MCNC: 0.96 MG/DL (ref 0.52–1.04)
CREAT SERPL-MCNC: 0.99 MG/DL (ref 0.52–1.04)
CREAT SERPL-MCNC: 1 MG/DL (ref 0.52–1.04)
CREAT SERPL-MCNC: 1.03 MG/DL (ref 0.52–1.04)
GFR SERPL CREATININE-BSD FRML MDRD: 67 ML/MIN/1.7M2
GFR SERPL CREATININE-BSD FRML MDRD: 69 ML/MIN/1.7M2
GFR SERPL CREATININE-BSD FRML MDRD: 70 ML/MIN/1.7M2
GFR SERPL CREATININE-BSD FRML MDRD: 72 ML/MIN/1.7M2
GFR SERPL CREATININE-BSD FRML MDRD: 80 ML/MIN/1.7M2
GLUCOSE BLDC GLUCOMTR-MCNC: 120 MG/DL (ref 70–99)
GLUCOSE BLDC GLUCOMTR-MCNC: 121 MG/DL (ref 70–99)
GLUCOSE BLDC GLUCOMTR-MCNC: 125 MG/DL (ref 70–99)
GLUCOSE BLDC GLUCOMTR-MCNC: 128 MG/DL (ref 70–99)
GLUCOSE BLDC GLUCOMTR-MCNC: 135 MG/DL (ref 70–99)
GLUCOSE BLDC GLUCOMTR-MCNC: 138 MG/DL (ref 70–99)
GLUCOSE BLDC GLUCOMTR-MCNC: 139 MG/DL (ref 70–99)
GLUCOSE BLDC GLUCOMTR-MCNC: 146 MG/DL (ref 70–99)
GLUCOSE BLDC GLUCOMTR-MCNC: 150 MG/DL (ref 70–99)
GLUCOSE BLDC GLUCOMTR-MCNC: 152 MG/DL (ref 70–99)
GLUCOSE BLDC GLUCOMTR-MCNC: 157 MG/DL (ref 70–99)
GLUCOSE BLDC GLUCOMTR-MCNC: 177 MG/DL (ref 70–99)
GLUCOSE BLDC GLUCOMTR-MCNC: 197 MG/DL (ref 70–99)
GLUCOSE BLDC GLUCOMTR-MCNC: 90 MG/DL (ref 70–99)
GLUCOSE SERPL-MCNC: 128 MG/DL (ref 70–99)
GLUCOSE SERPL-MCNC: 128 MG/DL (ref 70–99)
GLUCOSE SERPL-MCNC: 130 MG/DL (ref 70–99)
GLUCOSE SERPL-MCNC: 142 MG/DL (ref 70–99)
GLUCOSE SERPL-MCNC: 163 MG/DL (ref 70–99)
KETONES BLD-SCNC: 0.4 MMOL/L (ref 0–0.6)
KETONES BLD-SCNC: 0.6 MMOL/L (ref 0–0.6)
KETONES BLD-SCNC: 2.1 MMOL/L (ref 0–0.6)
KETONES BLD-SCNC: 2.3 MMOL/L (ref 0–0.6)
LACTATE BLD-SCNC: 3 MMOL/L (ref 0.7–2.1)
Lab: NORMAL
MAGNESIUM SERPL-MCNC: 1.7 MG/DL (ref 1.6–2.3)
MAGNESIUM SERPL-MCNC: 1.9 MG/DL (ref 1.6–2.3)
MAGNESIUM SERPL-MCNC: 2.5 MG/DL (ref 1.6–2.3)
MICRO REPORT STATUS: NORMAL
PHOSPHATE SERPL-MCNC: 1.6 MG/DL (ref 2.5–4.5)
PHOSPHATE SERPL-MCNC: 2 MG/DL (ref 2.5–4.5)
PHOSPHATE SERPL-MCNC: 2.2 MG/DL (ref 2.5–4.5)
POTASSIUM SERPL-SCNC: 3 MMOL/L (ref 3.4–5.3)
POTASSIUM SERPL-SCNC: 3.4 MMOL/L (ref 3.4–5.3)
POTASSIUM SERPL-SCNC: 3.5 MMOL/L (ref 3.4–5.3)
POTASSIUM SERPL-SCNC: 3.7 MMOL/L (ref 3.4–5.3)
POTASSIUM SERPL-SCNC: 3.8 MMOL/L (ref 3.4–5.3)
SODIUM SERPL-SCNC: 138 MMOL/L (ref 133–144)
SODIUM SERPL-SCNC: 140 MMOL/L (ref 133–144)
SODIUM SERPL-SCNC: 141 MMOL/L (ref 133–144)
SODIUM SERPL-SCNC: 142 MMOL/L (ref 133–144)
SODIUM SERPL-SCNC: 143 MMOL/L (ref 133–144)
SPECIMEN SOURCE: NORMAL

## 2017-01-15 PROCEDURE — 36415 COLL VENOUS BLD VENIPUNCTURE: CPT | Performed by: SURGERY

## 2017-01-15 PROCEDURE — 83735 ASSAY OF MAGNESIUM: CPT | Performed by: SURGERY

## 2017-01-15 PROCEDURE — 25000125 ZZHC RX 250: Performed by: INTERNAL MEDICINE

## 2017-01-15 PROCEDURE — 82010 KETONE BODYS QUAN: CPT | Performed by: SURGERY

## 2017-01-15 PROCEDURE — 00000146 ZZHCL STATISTIC GLUCOSE BY METER IP

## 2017-01-15 PROCEDURE — 80048 BASIC METABOLIC PNL TOTAL CA: CPT | Performed by: SURGERY

## 2017-01-15 PROCEDURE — 83605 ASSAY OF LACTIC ACID: CPT | Performed by: SURGERY

## 2017-01-15 PROCEDURE — 25800025 ZZH RX 258: Performed by: SURGERY

## 2017-01-15 PROCEDURE — 25000132 ZZH RX MED GY IP 250 OP 250 PS 637: Performed by: NURSE PRACTITIONER

## 2017-01-15 PROCEDURE — 25000132 ZZH RX MED GY IP 250 OP 250 PS 637: Performed by: INTERNAL MEDICINE

## 2017-01-15 PROCEDURE — 25000132 ZZH RX MED GY IP 250 OP 250 PS 637: Performed by: HOSPITALIST

## 2017-01-15 PROCEDURE — 25000125 ZZHC RX 250

## 2017-01-15 PROCEDURE — 12000001 ZZH R&B MED SURG/OB UMMC

## 2017-01-15 PROCEDURE — 99232 SBSQ HOSP IP/OBS MODERATE 35: CPT | Performed by: HOSPITALIST

## 2017-01-15 PROCEDURE — 84100 ASSAY OF PHOSPHORUS: CPT | Performed by: SURGERY

## 2017-01-15 PROCEDURE — 25000128 H RX IP 250 OP 636

## 2017-01-15 RX ORDER — CALCIUM CHLORIDE 100 MG/ML
1 INJECTION INTRAVENOUS; INTRAVENTRICULAR ONCE
Status: COMPLETED | OUTPATIENT
Start: 2017-01-15 | End: 2017-01-15

## 2017-01-15 RX ORDER — CALCIUM CHLORIDE 100 MG/ML
INJECTION INTRAVENOUS; INTRAVENTRICULAR
Status: COMPLETED
Start: 2017-01-15 | End: 2017-01-15

## 2017-01-15 RX ORDER — NICOTINE POLACRILEX 4 MG
15-30 LOZENGE BUCCAL
Status: DISCONTINUED | OUTPATIENT
Start: 2017-01-15 | End: 2017-01-15

## 2017-01-15 RX ORDER — DEXTROSE MONOHYDRATE 25 G/50ML
25-50 INJECTION, SOLUTION INTRAVENOUS
Status: DISCONTINUED | OUTPATIENT
Start: 2017-01-15 | End: 2017-01-15

## 2017-01-15 RX ADMIN — DIBASIC SODIUM PHOSPHATE, MONOBASIC POTASSIUM PHOSPHATE AND MONOBASIC SODIUM PHOSPHATE 250 MG: 852; 155; 130 TABLET ORAL at 20:22

## 2017-01-15 RX ADMIN — CALCIUM CHLORIDE 1 G: 100 INJECTION INTRAVENOUS; INTRAVENTRICULAR at 03:59

## 2017-01-15 RX ADMIN — POTASSIUM CHLORIDE 10 MEQ: 14.9 INJECTION, SOLUTION, CONCENTRATE PARENTERAL at 14:14

## 2017-01-15 RX ADMIN — HEPARIN SODIUM 5000 UNITS: 5000 INJECTION, SOLUTION INTRAVENOUS; SUBCUTANEOUS at 04:05

## 2017-01-15 RX ADMIN — HEPARIN SODIUM 5000 UNITS: 5000 INJECTION, SOLUTION INTRAVENOUS; SUBCUTANEOUS at 20:25

## 2017-01-15 RX ADMIN — PIPERACILLIN AND TAZOBACTAM 4.5 G: 4; .5 INJECTION, POWDER, FOR SOLUTION INTRAVENOUS at 17:44

## 2017-01-15 RX ADMIN — PIPERACILLIN AND TAZOBACTAM 4.5 G: 4; .5 INJECTION, POWDER, FOR SOLUTION INTRAVENOUS at 04:45

## 2017-01-15 RX ADMIN — SODIUM CHLORIDE, SODIUM LACTATE, POTASSIUM CHLORIDE, CALCIUM CHLORIDE AND DEXTROSE MONOHYDRATE: 5; 600; 310; 30; 20 INJECTION, SOLUTION INTRAVENOUS at 02:56

## 2017-01-15 RX ADMIN — VANCOMYCIN HYDROCHLORIDE 1500 MG: 10 INJECTION, POWDER, LYOPHILIZED, FOR SOLUTION INTRAVENOUS at 18:56

## 2017-01-15 RX ADMIN — HEPARIN SODIUM 5000 UNITS: 5000 INJECTION, SOLUTION INTRAVENOUS; SUBCUTANEOUS at 13:19

## 2017-01-15 RX ADMIN — INSULIN GLARGINE 45 UNITS: 100 INJECTION, SOLUTION SUBCUTANEOUS at 10:46

## 2017-01-15 RX ADMIN — PIPERACILLIN AND TAZOBACTAM 4.5 G: 4; .5 INJECTION, POWDER, FOR SOLUTION INTRAVENOUS at 11:27

## 2017-01-15 RX ADMIN — POTASSIUM PHOSPHATE, MONOBASIC AND POTASSIUM PHOSPHATE, DIBASIC 15 MMOL: 224; 236 INJECTION, SOLUTION INTRAVENOUS at 00:02

## 2017-01-15 RX ADMIN — POTASSIUM CHLORIDE 40 MEQ: 1.5 POWDER, FOR SOLUTION ORAL at 01:10

## 2017-01-15 RX ADMIN — POTASSIUM CHLORIDE 10 MEQ: 14.9 INJECTION, SOLUTION, CONCENTRATE PARENTERAL at 13:10

## 2017-01-15 RX ADMIN — POTASSIUM PHOSPHATE, MONOBASIC AND POTASSIUM PHOSPHATE, DIBASIC 20 MMOL: 224; 236 INJECTION, SOLUTION INTRAVENOUS at 09:15

## 2017-01-15 RX ADMIN — Medication 2 G: at 02:51

## 2017-01-15 RX ADMIN — VANCOMYCIN HYDROCHLORIDE 1500 MG: 10 INJECTION, POWDER, LYOPHILIZED, FOR SOLUTION INTRAVENOUS at 06:12

## 2017-01-15 RX ADMIN — POTASSIUM CHLORIDE 20 MEQ: 1.5 POWDER, FOR SOLUTION ORAL at 02:52

## 2017-01-15 NOTE — PROGRESS NOTES
ICU Staff:     The patient now meets criteria for transfer to a bryan bed. Will write transfer orders.      Qasim Palmer MD, PhD

## 2017-01-15 NOTE — PLAN OF CARE
Problem: Diabetes, Type 1 (Adult)  Goal: Signs and Symptoms of Listed Potential Problems Will be Absent or Manageable (Diabetes, Type 1)  Signs and symptoms of listed potential problems will be absent or manageable by discharge/transition of care (reference Diabetes, Type 1 (Adult) CPG).  D: Lytes and BGL monitoring.   I/A: Q2hr labs & q1hr BGL. Insulin gtt titrated to DKA algorithm 2. Anion gap WDL. Bicarb gtt stopped. Lytes replaced frequently, K+, Mg, Phos, and Ca replaced. 's, no ectopy noted. Capone removed.   P: Continue to monitor lytes and gtt. Diet once lytes and BGL's stablize. Continue current POC.      Transfer orders placed for 5a

## 2017-01-15 NOTE — CONSULTS
Brief note  Full consult to follow  Recommendations for transition from IV insulin to SQ    Glargine 45 units 2 hours prior to D/C of IV insulin  aspart prandial 2 unit /Carb unit with TID meals and snacks  aspart high intensity correction 1 unit/25 mg/dl BG >140 AC TID and BG>200 QHS  BGM AC, HS and 0200    Karen Lacey , [phone]

## 2017-01-15 NOTE — PLAN OF CARE
Problem: Goal Outcome Summary  Goal: Goal Outcome Summary  Outcome: Improving  Patient transferred to  from  around 0715 via wheelchair and on insulin drip. BG stable and drip turned off around noon due to BG being 90 and it was nearly 2 hours after Lantus had been given. Up independently to bathroom. Phos 1.6 this morning, replaced and to be re-checked this evening. Potassium 3.4 and replaced per protocol. Good appetite with meals. Carbs covered as ordered. Continue to assist and follow plan of care.

## 2017-01-15 NOTE — PROGRESS NOTES
"        [facility] - Internal Medicine Daily Note   Date of Service: 1/15/2017  Patient: Harsha Delgadillo  MRN: [id number]  Admission Date: 1/13/2017  Hospital Day # 2    Assessment & Plan      1. DKA in DM-1: Stop IV insulin. Gap closed. She reports non compliance with medications leading to DKA. Has not sued insulin for two days.   2. Hypovolemia, hypokalemia, hypomagnesemia, wide anion: Resolved.   3. Hypophosphatemia. Phos 1.6. Given IV phos.  Start oral phos as well. Recheck at 6 pm. If too low, give IV phos again  4. Leucocytosis: Likely from DKA only. Has some pyuria denies UTI symptoms. FU UC. Continue empiric Vanc and zosyn started in ICU. If cultures remain negative. We will DC AB tomorrow.     Consulting Services: endocrine    CODE: full  DVT: heparin  Diet/fluids: off  Disposition: home tomorrow if       Anastacia Alatorre  Internal Medicine Staff Hospitalist   [facility]   Pager: [phone]    Team: Eric Gan 1  Page Cross Cover after 5 pm: pager [phone]   ___________________________________________________________________    Subjective & Interval Hx:  She feels better. Has mild nausea. NO vomiting. No fevers. NO cough. No diarrhea. NO dysuria. No cough or cold.     Last 24 hr care team notes reviewed.   ROS:  4 point ROS including Respiratory, CV, GI and , other than that noted in the HPI, is negative.    Medications: Reviewed in EPIC. List below for reference    Physical Exam:    /77 mmHg  Temp(Src) 98.3  F (36.8  C) (Oral)  Resp 16  Ht 1.575 m (5' 2\")  Wt 69.5 kg (153 lb 3.5 oz)  SpO2 100%  LMP 12/29/2016     GENERAL: Alert and oriented x 3. NAD.   HEENT: Anicteric sclera. Mucous membranes moist.   CV: RRR. S1, S2. No murmurs appreciated.   RESPIRATORY: . Lungs CTAB with no wheezing, rales, rhonchi.   GI: Abdomen soft and non distended with normoactive bowel sounds present in all quadrants. No tenderness, rebound, guarding.   NEUROLOGICAL: No focal " deficits. Moves all extremities.    EXTREMITIES: No peripheral edema. Intact bilateral pedal pulses.   SKIN: No jaundice. No rashes.     Labs & Studies of Note: I personally reviewed the following studies:

## 2017-01-15 NOTE — CONSULTS
Diabetes/Hyperglycemia Management Consult      Reason for Consult  T1DM in ketoacidosis due to non compliance with meds, recommendations for management and transition off IV insulin  Consult requested by: Internal Medicine, Dr Alatorre  History of Present Illness 20 yo  woman with T1DM since age 8. Stopped taking medications for 2 days, and intermittently prior to that.Admitted to the ICU for DKA, after presenting to ED with glucose 700.  Reports has had DKA episodes 4-5X in past 5 years  Moved here from [place] 6 months ago and states is under a lot of stress trying to go to community college and working as well.  Moved here with mother, they previously resided with mom's boyfriend and states needed to move away due to problems with mom's boyfriend  Is being treated for depression with fluoxetine 20 mg qd, and had been going to therapy once per week in [place].  Not seeing anyone since move 6 month's ago    Denies complications of DM. SEE regimen below  BG lvel run 130-180's last few month's, but A1C's of 13 indicate much higher avg BG      Recent Labs  Lab 01/15/17  1304 01/15/17  1210 01/15/17  1100 01/15/17  0958 01/15/17  0901 01/15/17  0801  01/15/17  0610  01/15/17  0417  01/15/17  0205  01/15/17  0001  01/14/17  2209  01/14/17  2043   GLC  --   --   --   --   --   --   --  130*  --  128*  --  163*  --  128*  --  138*  --  141*   * 90 157* 197* 125* 128*  < >  --   < >  --   < >  --   < > 121*  < >  --   < >  --    < > = values in this interval not displayed.      Diabetes Type: 1  Diabetes Duration: age 8  Usual Diabetes Regimen: basal/bolus  lantus 40 units qd, CHO 1unit/7 grams , BG meter accucheck constance calculates correction she does not know ratio   Ability to [place] Prescribed Regimen: good - fair  Diabetes Control: A1C     13.7   1/13/2017  A1C     13.1   12/15/2016  Diabetes Complications: denies nephropathy,retinopathy,neuropathy, joint issues, or frequent infections  History of DKA: yes,    Able to Detect Hypoglycemia: yes  Usual Diabetes Care Provider: Dr. Ramirez    Factors Impacting Glucose Control: depression, stress, busy work and school schedule      Review of Systems  10 point ROS completed with pertinent positives and negatives noted in the HPI    Past medical, family and social histories are reviewed and updated.    Past Medical History  Past Medical History   Diagnosis Date     Diabetes (H)      Depressive disorder        Family History  Family History   Problem Relation Age of Onset     DIABETES Paternal Grandmother        Social History  Social History     Social History     Marital Status: Single     Spouse Name: N/A     Number of Children: N/A     Years of Education: N/A     Social History Main Topics     Smoking status: Never Smoker      Smokeless tobacco: Not on file     Alcohol Use: No     Drug Use: Not on file     Sexual Activity: Not Currently     Other Topics Concern     Not on file     Social History Narrative         Physical Exam  Temp: 98.3  F (36.8  C) Temp  Min: 98.3  F (36.8  C)  Max: 99.3  F (37.4  C)  Resp: 16 Resp  Min: 13  Max: 22  BP: 123/77 mmHg Systolic (24hrs), Av mmHg, Min:93 mmHg, Max:123 mmHg  Diastolic (24hrs), Av mmHg, Min:65 mmHg, Max:81 mmHg    General:  pleasant sleeping,easily aroused, resting in bed, in no distress.   HEENT: NC/AT, PER and anicteric, non-injected, oral mucous membranes moist.   Lungs: RRR respiration, neg cough  ABD: soft nondistended  Skin: warm and dry, no obvious lesions  MSK:  fluid movement of all extremities, ROM normal  Lymp:  no LE edema   Mental status:  alert, oriented x3, communicating clearly  Psych:  calm, even mood, somewhat flat affect    Laboratory  Recent Labs   Lab Test  01/15/17   0610  01/15/17   0417   NA  143  141   POTASSIUM  3.4  3.8   CHLORIDE  111*  110*   CO2  22  20   ANIONGAP  10  12   GLC  130*  128*   BUN  8  8   CR  0.99  0.88   NYDIA  7.7*  8.9     CBC RESULTS:   Recent Labs   Lab Test  17    0316   WBC  20.4*   RBC  3.77*   HGB  11.2*   HCT  34.0*   MCV  90   MCH  29.7   MCHC  32.9   RDW  14.1   PLT  260       Liver Function Studies -   Recent Labs   Lab Test  01/14/17   0316   PROTTOTAL  5.5*   ALBUMIN  2.5*   BILITOTAL  0.4   ALKPHOS  89   AST  15   ALT  15       Active Medications  Current Facility-Administered Medications   Medication     insulin glargine (LANTUS) injection 45 Units     insulin aspart (NovoLOG) inj (RAPID ACTING)     insulin aspart (NovoLOG) inj (RAPID ACTING)     insulin aspart (NovoLOG) inj (RAPID ACTING)     insulin aspart (NovoLOG) inj (RAPID ACTING)     Med Instruction - Transition from IV Insulin Infusion to Sub-Q Insulin     phosphorus tablet 250 mg (K PHOS NEUTRAL) per tablet 250 mg     naloxone (NARCAN) injection 0.1-0.4 mg     heparin sodium PF injection 5,000 Units     glucose 40 % gel 15-30 g    Or     dextrose 50 % injection 25-50 mL    Or     glucagon injection 1 mg     Patient is already receiving anticoagulation with heparin, enoxaparin (LOVENOX), warfarin (COUMADIN)  or other anticoagulant medication     potassium chloride SA (K-DUR/KLOR-CON M) CR tablet 20-40 mEq     potassium chloride (KLOR-CON) Packet 20-40 mEq     potassium chloride 10 mEq in 100 mL intermittent infusion     potassium chloride 10 mEq in 100 mL intermittent infusion with 10 mg lidocaine     potassium chloride 20 mEq in 50 mL intermittent infusion     potassium phosphate 10 mmol in D5W 250 mL intermittent infusion     potassium phosphate 15 mmol in D5W 250 mL intermittent infusion     potassium phosphate 20 mmol in D5W 500 mL intermittent infusion     potassium phosphate 20 mmol in D5W 250 mL intermittent infusion     potassium phosphate 25 mmol in D5W 500 mL intermittent infusion     piperacillin-tazobactam (ZOSYN) 4.5 g vial to attach to  mL bag     vancomycin (VANCOCIN) 1,500 mg in NaCl 0.9 % 250 mL intermittent infusion     magnesium sulfate 2 g in NS intermittent infusion (Phar[facility] or  FV Cmpd)     magnesium sulfate 4 g in 100 mL sterile water (premade)     acetaminophen (TYLENOL) tablet 650 mg     lidocaine 1 % 1 mL     lidocaine (LMX4) kit     sodium chloride (PF) 0.9% PF flush 3 mL     sodium chloride (PF) 0.9% PF flush 3 mL     dextrose 5% and 0.45% NaCl infusion     No current outpatient prescriptions on file.       Current Diet    Active Diet Order  Moderate Consistent CHO Diet      Assessment   20 yo  woman with T1DM since age 8. Recovering  from DKA. Non compliance mainly due to social stressors, move from [place], living situation, work and school, depression. Is well versed in how to manage Diabetes and adequate knowledge base.  IV insulin readings and A1C levels indicate adjustments to Basal insulin and possibly correction scale would be beneficial. Restarting psychological therapy would help with current stressors.   can refer      Plan  Recommendations for transition from IV insulin to SQ    Glargine 45 units 2 hours prior to D/C of IV insulin, started @ 09:30AM  aspart prandial 2 unit /Carb unit with TID meals and snacks  aspart high intensity correction 1 unit/25 mg/dl BG >140 AC TID and BG>200 QHS  BGM AC, HS and 0200  Monitor BG levels for adjustments      Karen Lacey APRN [id number]    Diabetes Management Team job code: [id number]

## 2017-01-15 NOTE — PROVIDER NOTIFICATION
Sepsis protocol triggered due to tachycardia and elevated WBC. LA ordered and came back at 3.0. Patient asymptomatic. Notified Britney Pizano. Will continue to monitor.

## 2017-01-16 VITALS
HEIGHT: 62 IN | SYSTOLIC BLOOD PRESSURE: 128 MMHG | OXYGEN SATURATION: 98 % | DIASTOLIC BLOOD PRESSURE: 91 MMHG | BODY MASS INDEX: 32.07 KG/M2 | TEMPERATURE: 97.6 F | RESPIRATION RATE: 16 BRPM | WEIGHT: 174.3 LBS

## 2017-01-16 LAB
ANION GAP SERPL CALCULATED.3IONS-SCNC: 8 MMOL/L (ref 3–14)
BUN SERPL-MCNC: 6 MG/DL (ref 7–30)
CALCIUM SERPL-MCNC: 8 MG/DL (ref 8.5–10.1)
CHLORIDE SERPL-SCNC: 110 MMOL/L (ref 94–109)
CO2 SERPL-SCNC: 21 MMOL/L (ref 20–32)
CREAT SERPL-MCNC: 0.99 MG/DL (ref 0.52–1.04)
ERYTHROCYTE [DISTWIDTH] IN BLOOD BY AUTOMATED COUNT: 14.5 % (ref 10–15)
GFR SERPL CREATININE-BSD FRML MDRD: 70 ML/MIN/1.7M2
GLUCOSE BLDC GLUCOMTR-MCNC: 102 MG/DL (ref 70–99)
GLUCOSE BLDC GLUCOMTR-MCNC: 201 MG/DL (ref 70–99)
GLUCOSE BLDC GLUCOMTR-MCNC: 51 MG/DL (ref 70–99)
GLUCOSE BLDC GLUCOMTR-MCNC: 54 MG/DL (ref 70–99)
GLUCOSE BLDC GLUCOMTR-MCNC: 68 MG/DL (ref 70–99)
GLUCOSE BLDC GLUCOMTR-MCNC: 99 MG/DL (ref 70–99)
GLUCOSE SERPL-MCNC: 241 MG/DL (ref 70–99)
HCT VFR BLD AUTO: 31.3 % (ref 35–47)
HGB BLD-MCNC: 10.1 G/DL (ref 11.7–15.7)
LACTATE BLD-SCNC: 2.5 MMOL/L (ref 0.7–2.1)
LACTATE BLD-SCNC: 2.6 MMOL/L (ref 0.7–2.1)
MAGNESIUM SERPL-MCNC: 1.9 MG/DL (ref 1.6–2.3)
MCH RBC QN AUTO: 29 PG (ref 26.5–33)
MCHC RBC AUTO-ENTMCNC: 32.3 G/DL (ref 31.5–36.5)
MCV RBC AUTO: 90 FL (ref 78–100)
PHOSPHATE SERPL-MCNC: 2.2 MG/DL (ref 2.5–4.5)
PLATELET # BLD AUTO: 207 10E9/L (ref 150–450)
POTASSIUM SERPL-SCNC: 3.6 MMOL/L (ref 3.4–5.3)
RBC # BLD AUTO: 3.48 10E12/L (ref 3.8–5.2)
SODIUM SERPL-SCNC: 139 MMOL/L (ref 133–144)
VANCOMYCIN SERPL-MCNC: 49.6 MG/L
WBC # BLD AUTO: 7.3 10E9/L (ref 4–11)

## 2017-01-16 PROCEDURE — 25000128 H RX IP 250 OP 636: Performed by: INTERNAL MEDICINE

## 2017-01-16 PROCEDURE — 83605 ASSAY OF LACTIC ACID: CPT | Performed by: SURGERY

## 2017-01-16 PROCEDURE — 00000146 ZZHCL STATISTIC GLUCOSE BY METER IP

## 2017-01-16 PROCEDURE — 36592 COLLECT BLOOD FROM PICC: CPT | Performed by: SURGERY

## 2017-01-16 PROCEDURE — 25000128 H RX IP 250 OP 636

## 2017-01-16 PROCEDURE — 25000132 ZZH RX MED GY IP 250 OP 250 PS 637: Performed by: INTERNAL MEDICINE

## 2017-01-16 PROCEDURE — 84100 ASSAY OF PHOSPHORUS: CPT | Performed by: HOSPITALIST

## 2017-01-16 PROCEDURE — 25000125 ZZHC RX 250

## 2017-01-16 PROCEDURE — 99239 HOSP IP/OBS DSCHRG MGMT >30: CPT | Performed by: HOSPITALIST

## 2017-01-16 PROCEDURE — 25000125 ZZHC RX 250: Performed by: INTERNAL MEDICINE

## 2017-01-16 PROCEDURE — 83735 ASSAY OF MAGNESIUM: CPT | Performed by: HOSPITALIST

## 2017-01-16 PROCEDURE — 25000132 ZZH RX MED GY IP 250 OP 250 PS 637: Performed by: STUDENT IN AN ORGANIZED HEALTH CARE EDUCATION/TRAINING PROGRAM

## 2017-01-16 PROCEDURE — 80048 BASIC METABOLIC PNL TOTAL CA: CPT | Performed by: HOSPITALIST

## 2017-01-16 PROCEDURE — 83605 ASSAY OF LACTIC ACID: CPT | Performed by: STUDENT IN AN ORGANIZED HEALTH CARE EDUCATION/TRAINING PROGRAM

## 2017-01-16 PROCEDURE — 25000132 ZZH RX MED GY IP 250 OP 250 PS 637: Performed by: NURSE PRACTITIONER

## 2017-01-16 PROCEDURE — 36415 COLL VENOUS BLD VENIPUNCTURE: CPT | Performed by: SURGERY

## 2017-01-16 PROCEDURE — 36592 COLLECT BLOOD FROM PICC: CPT | Performed by: STUDENT IN AN ORGANIZED HEALTH CARE EDUCATION/TRAINING PROGRAM

## 2017-01-16 PROCEDURE — 36415 COLL VENOUS BLD VENIPUNCTURE: CPT | Performed by: HOSPITALIST

## 2017-01-16 PROCEDURE — 25000132 ZZH RX MED GY IP 250 OP 250 PS 637: Performed by: HOSPITALIST

## 2017-01-16 PROCEDURE — 80202 ASSAY OF VANCOMYCIN: CPT | Performed by: SURGERY

## 2017-01-16 PROCEDURE — 90686 IIV4 VACC NO PRSV 0.5 ML IM: CPT | Performed by: INTERNAL MEDICINE

## 2017-01-16 PROCEDURE — 85027 COMPLETE CBC AUTOMATED: CPT | Performed by: HOSPITALIST

## 2017-01-16 RX ORDER — LANOLIN ALCOHOL/MO/W.PET/CERES
3 CREAM (GRAM) TOPICAL
Status: DISCONTINUED | OUTPATIENT
Start: 2017-01-16 | End: 2017-01-16 | Stop reason: HOSPADM

## 2017-01-16 RX ADMIN — Medication 2 G: at 12:50

## 2017-01-16 RX ADMIN — HEPARIN SODIUM 5000 UNITS: 5000 INJECTION, SOLUTION INTRAVENOUS; SUBCUTANEOUS at 03:04

## 2017-01-16 RX ADMIN — PIPERACILLIN AND TAZOBACTAM 4.5 G: 4; .5 INJECTION, POWDER, FOR SOLUTION INTRAVENOUS at 02:03

## 2017-01-16 RX ADMIN — PIPERACILLIN AND TAZOBACTAM 4.5 G: 4; .5 INJECTION, POWDER, FOR SOLUTION INTRAVENOUS at 08:58

## 2017-01-16 RX ADMIN — POTASSIUM CHLORIDE 20 MEQ: 750 TABLET, EXTENDED RELEASE ORAL at 08:41

## 2017-01-16 RX ADMIN — POTASSIUM PHOSPHATE, MONOBASIC AND POTASSIUM PHOSPHATE, DIBASIC 15 MMOL: 224; 236 INJECTION, SOLUTION INTRAVENOUS at 08:58

## 2017-01-16 RX ADMIN — HEPARIN SODIUM 5000 UNITS: 5000 INJECTION, SOLUTION INTRAVENOUS; SUBCUTANEOUS at 12:41

## 2017-01-16 RX ADMIN — MELATONIN TAB 3 MG 3 MG: 3 TAB at 03:04

## 2017-01-16 RX ADMIN — INFLUENZA A VIRUS A/CALIFORNIA/7/2009 X-179A (H1N1) ANTIGEN (FORMALDEHYDE INACTIVATED), INFLUENZA A VIRUS A/HONG KONG/4801/2014 X-263B (H3N2) ANTIGEN (FORMALDEHYDE INACTIVATED), INFLUENZA B VIRUS B/PHUKET/3073/2013 ANTIGEN (FORMALDEHYDE INACTIVATED), AND INFLUENZA B VIRUS B/BRISBANE/60/2008 ANTIGEN (FORMALDEHYDE INACTIVATED) 0.5 ML: 15; 15; 15; 15 INJECTION, SUSPENSION INTRAMUSCULAR at 16:27

## 2017-01-16 RX ADMIN — DIBASIC SODIUM PHOSPHATE, MONOBASIC POTASSIUM PHOSPHATE AND MONOBASIC SODIUM PHOSPHATE 250 MG: 852; 155; 130 TABLET ORAL at 08:41

## 2017-01-16 RX ADMIN — INSULIN ASPART 5 UNITS: 100 INJECTION, SOLUTION INTRAVENOUS; SUBCUTANEOUS at 08:41

## 2017-01-16 RX ADMIN — VANCOMYCIN HYDROCHLORIDE 1500 MG: 10 INJECTION, POWDER, LYOPHILIZED, FOR SOLUTION INTRAVENOUS at 06:59

## 2017-01-16 RX ADMIN — INSULIN GLARGINE 45 UNITS: 100 INJECTION, SOLUTION SUBCUTANEOUS at 08:41

## 2017-01-16 NOTE — PROGRESS NOTES
Diabetes Consult Daily  Progress Note          Assessment/Plan:   22 yo  woman with T1DM since age 8. Stopped taking medications for 2 days, and intermittently prior to that.Admitted to the ICU for DKA, after presenting to ED with glucose 700.  Reports has had DKA episodes 4-5X in past 5 years    Transitioned from IV to SQ yesterday with increases to SQ regimen from previous home RX  BG high @730A due to food intake with no insulin coverage overnight, not corrected until 08:40   BG low 54 @ 1300 due to overlap over lantus doses by 2.5 hours  Otherwise BG in range since transition yesterday    PLAN   Glargine 45 units qd  aspart prandial 2 unit /Carb unit with TID meals and snacks  aspart high intensity correction 1 unit/25 mg/dl BG >140 AC TID and BG>200 QHS  BGM AC, HS and 0200  Monitor BG levels for adjustments     continue to follow      Interval History:   Tired today, just waking up @1300  Feeling just OK.  Appetite is good  BG 54  1300 has not eaten yet and lantus given 2.5 hours earlier than yesterday   @0730A had eaten overnight and did not ask for insulin coverage, was given correction insulin@  0841 (same time as Lantus)  No other C/O        Recent Labs  Lab 01/16/17  0723 01/15/17  2151 01/15/17  1702 01/15/17  1623 01/15/17  1304 01/15/17  1210 01/15/17  1100 01/15/17  0958  01/15/17  0610  01/15/17  0417  01/15/17  0205  01/15/17  0001   *  --   --  142*  --   --   --   --   --  130*  --  128*  --  163*  --  128*   BGM  --  139* 138*  --  120* 90 157* 197*  < >  --   < >  --   < >  --   < > 121*   < > = values in this interval not displayed.            Review of Systems:      per interval history       Medications:       Active Diet Order  Moderate Consistent CHO Diet     Physical Exam:  Gen: Alert, resting in bed, in NAD   HEENT: NC/AT, mucous membranes are moist  Resp: Unlabored  Ext: No lower extremity edema   Neuro:oriented x3, communicating  "clearly  /89 mmHg  Temp(Src) 98.3  F (36.8  C) (Oral)  Resp 16  Ht 1.575 m (5' 2\")  Wt 79.062 kg (174 lb 4.8 oz)  BMI 31.87 kg/m2  SpO2 98%  LMP 12/29/2016           Data:   A1C     13.7   1/13/2017  A1C     13.1   12/15/2016           CBC RESULTS:   Recent Labs   Lab Test  01/16/17   0723   WBC  7.3   RBC  3.48*   HGB  10.1*   HCT  31.3*   MCV  90   MCH  29.0   MCHC  32.3   RDW  14.5   PLT  207     Recent Labs   Lab Test  01/16/17   0723  01/15/17   1623   NA  139  138   POTASSIUM  3.6  3.7   CHLORIDE  110*  108   CO2  21  21   ANIONGAP  8  9   GLC  241*  142*   BUN  6*  5*   CR  0.99  1.00   NYDIA  8.0*  8.2*     Liver Function Studies -   Recent Labs   Lab Test  01/14/17   0316   PROTTOTAL  5.5*   ALBUMIN  2.5*   BILITOTAL  0.4   ALKPHOS  89   AST  15   ALT  15     No results found for this basename: paramjit Lacey, CNP pager [phone]  Diabetes Management Job Code [id number]            "

## 2017-01-16 NOTE — PROGRESS NOTES
Social Work: Assessment with Discharge Plan    Patient Name:  Harsha Delgadillo  :  1995  Age:  21 year old  MRN:  [id number]  Risk/Complexity Score:  Filed Complexity Screen Score: 3  Completed assessment with:  Chart review, RN    Presenting Information   Reason for Referral:  compliance, requesting psychology referral  Date of Intake:  2017  Referral Source:  Nurse  Decision Maker:  Self  Alternate Decision Maker:  none  Living Situation:  House  Previous Functional Status:  Independent  Patient and family understanding of hospitalization:  She verbalizes non-compliance with diabetes management. Acknowledges understanding and cognoscente of negligence in management, attributes to school/life stress.  Cultural/Language/Spiritual Considerations:  none  Adjustment to Illness:  Accepting, understanding, has been managing diabetes successfully since age 8    Physical Health  Reason for Admission:    1. Type 1 diabetes mellitus without complication (H)    2. Diabetic ketoacidosis without coma associated with type 1 diabetes mellitus (H)    3. Delirium    4. Renal insufficiency    5. Hyperkalemia    6. Diabetes mellitus type 1 (H)      Services Needed/Recommended:  Home with no services    Mental Health/Chemical Dependency  Diagnosis:  Depression, adjustment to illness, anxiety  Support/Services in Place:  None, history of psychology/psychiatry, requests referral today  Services Needed/Recommended:  TISHA provided patient with information to [facility] in [place], TISHA also completed online referral for patient per her request.    Support System  Significant relationship at present time:  Mother  Family of origin is available for support:  Yes  Other support available:  Family  Gaps in support system:  None noted  Patient is caregiver to:  None     Provider Information   Primary Care Physician:  None   None   Clinic:  None      :  None    Financial   Income Source:  Works  part time on her campus at [employer]  Financial Concerns:  None noted, resides with mother  Insurance:    Payor/Plan Subscriber Name Rel Member # Group #   BCBS - [employer] OUT OF [employer]  [id number]       [id number]       Discharge Plan   Patient and family discharge goal:  Yes  Provided education on discharge plan:  YES  Patient agreeable to discharge plan:  YES  A list of Medicare Certified Facilities was provided to the patient and/or family to encourage patient choice. Patient's choices for facility are:  NA  Will NH provide Skilled rehabilitation or complex medical:  NA  General information regarding anticipated insurance coverage and possible out of pocket cost was discussed. Patient and patient's family are aware patient may incur the cost of transportation to the facility, pending insurance payment: NA  Barriers to discharge:  None    Discharge Recommendations   Anticipated Disposition:  Home, no needs identified  Transportation Needs:  Family:  mother  Name of Transportation Company and Phone:  NA    Additional comments   SW completed referral for outpatient psychiatry/psychology, provided patient with contact info for [facility].    Krysta GIRALDO, MSW  5B  (Medical/Surgical)  Phone: [phone]  Pager: [phone]

## 2017-01-16 NOTE — PROGRESS NOTES
CLINICAL NUTRITION SERVICES  Nutrition Note: positive risk screen for uncontrolled/New diabetes    Chart reviewed. Med non-compliance and infection noted. Pt reports history of DM1. Knows carbohydrate counting diet guidelines. Understands insulin management. Denies need for diet review/education.   No nutrition risk factors identified at this time.     Follow Up / Monitoring:   Will follow per protocol.    Davie Lynch RD/MAN  Pager [phone]

## 2017-01-16 NOTE — PLAN OF CARE
Problem: Goal Outcome Summary  Goal: Goal Outcome Summary  Writer assumed care of patient at 1900.    Patient A/O, VSS. Resting in between cares. Bedtime , no coverage given. Up independently in room. Will continue to monitor and alert MDs to any changes.

## 2017-01-16 NOTE — PLAN OF CARE
Problem: Goal Outcome Summary  Goal: Goal Outcome Summary  Outcome: No Change  Patient denies pain. Slept until noon. BG this morning was 241 and down to 54 prior to lunch (due to overlap of Lantus dose). BG back up to 99 after some apple juice and lucie crackers. Patient also ate lunch which carbs were covered. Up independently. Mag, Phos and Potassium replaced. IV abx discontinued. Possible discharge to home this evening (per pt). Continue to assist and follow plan of care.

## 2017-01-17 ENCOUNTER — CARE COORDINATION (OUTPATIENT)
Dept: CARDIOLOGY | Facility: CLINIC | Age: 22
End: 2017-01-17

## 2017-01-17 LAB — INTERPRETATION ECG - MUSE: NORMAL

## 2017-01-17 NOTE — PROGRESS NOTES
"[facility]  \"Hello, my name is Virginia Deleon , and I am calling from the [facility].  I want to check in and see how you are doing, after leaving the hospital.  You may also receive a call from your Care Coordinator (care team), but I want to make sure you don t have any urgent needs.  I have a couple questions to review with you:     Post-Discharge Outreach                                                    Harsha Delgadillo is a 21 year old female     Follow-up Appointments      Adult [facility] Follow-up and recommended labs and tests        FU with endocrinology in two weeks time for management of diabetes.       Appointments on [place] and/or [facility] (with [facility] or [facility] provider or service). Call [phone] if you haven't heard regarding these appointments within 7 days of discharge.                       Your next 10 appointments already scheduled      Mar 16, 2017  8:30 AM   (Arrive by 8:00 AM)   RETURN DIABETES with Roberto Bland MD   [facility] Endocrinology ([facility])               Care Team:    Patient Care Team       Relationship Specialty Notifications Start End    None PCP - General   8/22/16     Roberto Bland MD Referring Physician Student in organized health care education/training program  10/6/16     Comment:  Opth Referral    Phone: [phone] Fax: [phone]         [address] [facility] [address]    Tonya Flaherty MD MD Ophthalmology  10/6/16     Phone: [phone] Fax: [phone]         [address] [address]            Transition of Care Review                                                      Patient was called three times and no answer so post 24 hr DC follow up calls will be closed out               Plan                                                      Thanks for your time.  Your Care Coordinator will follow-up " within the next couple days.  In the meantime if you have questions, concerns or problems call your care team.        Virginia Deleon

## 2017-01-17 NOTE — PLAN OF CARE
Problem: Goal Outcome Summary  Goal: Goal Outcome Summary  Outcome: Adequate for Discharge Date Met:  01/16/17  Patient was hypoglycemic at start of shift and was given apple juice and allowed to eat supper and thus hypoglycemia was resolved. Patient discharged from unit to home in stable condition, ambulatory and accompanied by mother. Discharge instructions given earlier including new medication as well as future MD appointments. Verbalized understanding of DC instructions and had no questions. Personal belongings taken by  Patient.

## 2017-01-19 LAB
BACTERIA SPEC CULT: NO GROWTH
MICRO REPORT STATUS: NORMAL
SPECIMEN SOURCE: NORMAL

## 2017-01-20 LAB
BACTERIA SPEC CULT: NO GROWTH
MICRO REPORT STATUS: NORMAL
SPECIMEN SOURCE: NORMAL

## 2017-01-23 NOTE — DISCHARGE SUMMARY
[facility]  Discharge Summary    Harsha Delgadillo MRN# [id number]   YOB: 1995 Age: 21 year old     Date of Admission:  1/13/2017  Date of Discharge:  1/16/2017  6:33 PM  Admitting Physician:  Qasim Palmer MD  Discharge Physician:  Anastacia Alatorre MD  Discharging Service:  Internal Medicine     Primary Provider: None            Discharge Diagnosis:     Primary Discharge Diagnosis:    1. DKA in DM-1 due to medical non compliance.    2. Hypovolemia, hypokalemia, hypomagnesemia, wide anion: Resolved.    3. Hypophosphatemia. Phos 1.6.  4. Leucocytosis: Likely from DKA only.  5. Pyuria denies UTI symptoms and UC negative. So NO UTI  6. Metabolic Encephalopathy    Past Medical History   Diagnosis Date     Diabetes (H)      Depressive disorder                 Discharge Medications:     Discharge Medication List as of 1/16/2017  4:35 PM      START taking these medications    Details   phosphorus tablet 250 mg (K PHOS NEUTRAL) 250 MG per tablet Take 1 tablet (250 mg) by mouth 2 times daily, Disp-6 tablet, R-0, Fax      insulin aspart (NOVOLOG) 100 UNITS/ML injection If Pre-meal Glucose  140 - 164 give 1 unit  165 - 189 give 2 units  190 - 214 give 3 units  215 - 239 give 4 units  240 - 264 give 5 units  265 - 289 give 6 units  290 - 314 give 7 units  315 - 339 give 8 units  > 339 give 9 units    If Bedtime Glucose   200 - 214 give 1.5 units   215 - 239 give 2 units  240 - 264 give 2.5 units  265 - 289 give 3 units  290 - 314 give 3.5 units  315 - 339 give 4 units  > 339 give 4.5 units, Disp-10 mL, R-0, Fax         CONTINUE these medications which have CHANGED    Details   insulin glargine (LANTUS SOLOSTAR) 100 UNIT/ML injection Inject 45 Units Subcutaneous daily (with dinner), Disp-15 mL, R-3, Fax         CONTINUE these medications which have NOT CHANGED    Details   insulin lispro (HUMALOG PENFILL) 100 UNIT/ML Cartridge For use with Luxura pen device. (patient has pen device  "already). Use 1 unit: 7 grams of carbohydrate for meals and snacks. Use correction scale per instructions., Disp-15 mL, R-3, Fax      FLUoxetine (PROZAC) 20 MG capsule Take 1 capsule (20 mg) by mouth daily, Disp-30 capsule, R-2, Fax      blood glucose monitoring (ACCU-CHEK BUNNY PLUS) test strip Use to test blood sugar 6-8 times daily or as directed.  Ok to substitute alternative if insurance prefers.Disp-200 each, R-12Fax      blood glucose monitoring (ACCU-CHEK FASTCLIX) lancets Use to test blood sugar 6-8 times daily or as directed.  Ok to substitute alternative if insurance prefers., Disp-1 Box, R-prn, Fax      acetone, Urine, test STRP Historical      Insulin Pen Needle (PEN NEEDLES 3/16\") 31G X 5 MM MISC Historical      glucagon 1 MG injection Inject 1 mg into the muscle once for 1 dose, Disp-1 mg, R-1, Fax         STOP taking these medications       Soap & Cleansers (MASTISOL LIQUID ADHESIVE EX) Comments:   Reason for Stopping:                    Hospital Course:     Ms. Delgadillo is a 21 year old female with PMH of DM type I, history of DKA and intubation; who presents for high sugars at home.     Patient presented with her mother to the ED for blood glucose in 700s. had some back pain. No nausea, vomiting, or diarrhea. No fevers or chills. \"this is what she is like when she's in DKA\" per her mother.  Mother says \"this is typical for her when she is very ill.\"  She was confused. Alert but responses were not appropriate on admission.      In the ED, blood glucose, 270. Ph 6.92.  Bicarb 9. AG 23. ketones 4.2, Cr 1.45. She was given IVF boluses, started on insluin gtt, given bicarb amps, then ultimately a bicarb gtt as well. CXR clear. CT abd done and it was unrevealing.  She was admitted to ICU for management of DKA. She has several electrolyte deranagment as noted below and it was corrected. WBC count was high, so empirically given Vanc and Zosyn but later on stopped as cultures were negative. Turned out to be " "non compliance with insulin as the cause of her DKA.  She is under stress due to there studies and was not taking insulin. She told me later on.     1. DKA in DM-1: Stopped IV insulin drip. Gap closed. She reports non compliance with medications leading to DKA. Has not sued insulin for two days.  Seen by Endocrine team. We restarted her home regimen. She is to keep a log of BG with meals and HS and report to her endocrinologist.   2. Hypovolemia, hypokalemia, hypomagnesemia, wide anion:  Replaced and resolved.     3. Hypophosphatemia. Phos 1.6. Given IV phos.  Started oral phos as well. Improving.   4. Leucocytosis: Likely from DKA only. Has some pyuria denies UTI symptoms. UC negative. Empiric Vanc and zosyn started in ICU. It was stopped. Since cultures remain negative.                  Discharge Disposition:   Discharged to home           Condition on Discharge:   Discharge condition: Stable   Code status on discharge: Full Code           Procedures:   none          Consultations:   Consultation during this admission received from:             Final Day of Progress before Discharge:         Physical Exam:    Blood pressure 128/91, temperature 97.6  F (36.4  C), temperature source Oral, resp. rate 16, height 1.575 m (5' 2\"), weight 79.062 kg (174 lb 4.8 oz), last menstrual period 12/29/2016, SpO2 98 %.  GENERAL: Alert and oriented x 3. NAD.   HEENT: Anicteric sclera. PERRL. Mucous membranes moist and without lesions.   CV: RRR. S1, S2. No murmurs appreciated.   RESPIRATORY: Effort normal. Lungs CTAB with no wheezing, rales, rhonchi.   GI: Abdomen soft and non distended with normoactive bowel sounds present in all quadrants. No tenderness, rebound, guarding.    Data:  All laboratory data reviewed             Significant Results:     Results for orders placed or performed during the hospital encounter of 01/13/17   Chest XR,  PA & LAT    Narrative    Exam:  XR CHEST 2 VW, 1/13/2017 10:19 PM    History: DKA, eval for " infiltrate    Comparison:  None    Findings:  The cardiac silhouette and pulmonary vessels are within  normal limits. The lungs are clear. No pleural effusion or  pneumothorax. No acute bony abnormality. The visualized abdomen is  unremarkable.      Impression    Impression: Clear lungs.    I have personally reviewed the examination and initial interpretation  and I agree with the findings.    GIOVANNI SNOW MD   CT Abdomen Pelvis w/o Contrast    Narrative    EXAMINATION: CT ABDOMEN PELVIS W/O CONTRAST, 1/14/2017 1:10 AM    TECHNIQUE:  Helical CT images from the lung bases through the pubic  symphysis were obtained without IV contrast.     COMPARISON: Chest x-ray, same day    HISTORY: DKA, elevated WBC, concern for intraaabdominal process    FINDINGS:    Abdomen and pelvis: Noncontrast evaluation of the liver, gallbladder,  spleen, adrenal glands, pancreas, and kidneys is within normal limits.  The bladder is decompressed with a Capone catheter in place. Mild  nonspecific bladder wall thickening. The stomach is moderately  distended and fluid-filled but is otherwise unremarkable. The small  bowel, colon, and appendix are within normal limits. The uterus and  adnexa are within normal limits for the patient's age. There is a  physiologic amount of free fluid in the pelvis. No free air. Pelvic  phleboliths are noted. Noncontrast attenuation the major abdominal  vessels is within normal limits. No pathologic abdominal or pelvic  lymphadenopathy. Mild mesenteric edema.    Lung bases:  Mosaic attenuation in the dependent lungs bilaterally is  likely secondary to air trapping.    Bones and soft tissues: No acute osseous abnormality or suspicious  bony lesion.      Impression    IMPRESSION:   Moderate gastric distention and mild nonspecific bladder wall  thickening with a Capone in place. Otherwise unremarkable noncontrast  CT of the abdomen and pelvis.        I have personally reviewed the examination and initial  interpretation  and I agree with the findings.    DONALD WYMAN MD   CBC with platelets differential   Result Value Ref Range    WBC 28.1 (H) 4.0 - 11.0 10e9/L    RBC Count 4.54 3.8 - 5.2 10e12/L    Hemoglobin 13.8 11.7 - 15.7 g/dL    Hematocrit 43.5 35.0 - 47.0 %    MCV 96 78 - 100 fl    MCH 30.4 26.5 - 33.0 pg    MCHC 31.7 31.5 - 36.5 g/dL    RDW 14.4 10.0 - 15.0 %    Platelet Count 357 150 - 450 10e9/L    Diff Method Automated Method     % Neutrophils 84.6 %    % Lymphocytes 6.7 %    % Monocytes 7.0 %    % Eosinophils 0.0 %    % Basophils 0.1 %    % Immature Granulocytes 1.6 %    Nucleated RBCs 0 0 /100    Absolute Neutrophil 23.8 (H) 1.6 - 8.3 10e9/L    Absolute Lymphocytes 1.9 0.8 - 5.3 10e9/L    Absolute Monocytes 2.0 (H) 0.0 - 1.3 10e9/L    Absolute Eosinophils 0.0 0.0 - 0.7 10e9/L    Absolute Basophils 0.0 0.0 - 0.2 10e9/L    Abs Immature Granulocytes 0.5 (H) 0 - 0.4 10e9/L    Absolute Nucleated RBC 0.0    Comprehensive metabolic panel   Result Value Ref Range    Sodium 140 133 - 144 mmol/L    Potassium 5.6 (H) 3.4 - 5.3 mmol/L    Chloride 108 94 - 109 mmol/L    Carbon Dioxide 9 (LL) 20 - 32 mmol/L    Anion Gap 23 (H) 3 - 14 mmol/L    Glucose 294 (H) 70 - 99 mg/dL    Urea Nitrogen 27 7 - 30 mg/dL    Creatinine 1.45 (H) 0.52 - 1.04 mg/dL    GFR Estimate 45 (L) >60 mL/min/1.7m2    GFR Estimate If Black 55 (L) >60 mL/min/1.7m2    Calcium 8.5 8.5 - 10.1 mg/dL    Bilirubin Total 0.3 0.2 - 1.3 mg/dL    Albumin 3.5 3.4 - 5.0 g/dL    Protein Total 7.9 6.8 - 8.8 g/dL    Alkaline Phosphatase 128 40 - 150 U/L    ALT 28 0 - 50 U/L    AST 22 0 - 45 U/L   Lactic acid   Result Value Ref Range    Lactic Acid 2.9 (H) 0.7 - 2.1 mmol/L   Creatinine POCT   Result Value Ref Range    Creatinine 1.4 (H) 0.52 - 1.04 mg/dL    GFR Estimate 47 (L) >60 mL/min/1.7m2    GFR Estimate If Black 57 (L) >60 mL/min/1.7m2   Glucose by meter   Result Value Ref Range    Glucose 270 (H) 70 - 99 mg/dL   HCG qualitative urine   Result Value Ref  Range    HCG Qual Urine Negative NEG   UA with Microscopic reflex to Culture   Result Value Ref Range    Color Urine Light Yellow     Appearance Urine Slightly Cloudy     Glucose Urine >1000 (A) NEG mg/dL    Bilirubin Urine Negative NEG    Ketones Urine >150 (A) NEG mg/dL    Specific Gravity Urine 1.008 1.003 - 1.035    Blood Urine Trace (A) NEG    pH Urine 5.5 5.0 - 7.0 pH    Protein Albumin Urine 100 (A) NEG mg/dL    Urobilinogen mg/dL Normal 0.0 - 2.0 mg/dL    Nitrite Urine Negative NEG    Leukocyte Esterase Urine Negative NEG    Source Catheterized Urine     WBC Urine 13 (H) 0 - 2 /HPF    RBC Urine 2 0 - 2 /HPF    Squamous Epithelial /HPF Urine 1 0 - 1 /HPF   Hemoglobin A1c   Result Value Ref Range    Hemoglobin A1C 13.7 (H) 4.3 - 6.0 %   Magnesium   Result Value Ref Range    Magnesium 2.2 1.6 - 2.3 mg/dL   Phosphorus   Result Value Ref Range    Phosphorus 5.3 (H) 2.5 - 4.5 mg/dL   Ketone quantitative   Result Value Ref Range    Ketone Quantitative 4.2 (HH) 0.0 - 0.6 mmol/L   Glucose by meter   Result Value Ref Range    Glucose 287 (H) 70 - 99 mg/dL   Glucose by meter   Result Value Ref Range    Glucose 378 (H) 70 - 99 mg/dL   Glucose by meter   Result Value Ref Range    Glucose 371 (H) 70 - 99 mg/dL   Blood gas venous   Result Value Ref Range    Ph Venous 7.04 (LL) 7.32 - 7.43 pH    PCO2 Venous 20 (L) 40 - 50 mm Hg    PO2 Venous 38 25 - 47 mm Hg    Bicarbonate Venous 5 (LL) 21 - 28 mmol/L    Base Deficit Venous 23.6 mmol/L    FIO2 21    Glucose by meter   Result Value Ref Range    Glucose 257 (H) 70 - 99 mg/dL   Potassium   Result Value Ref Range    Potassium 3.9 3.4 - 5.3 mmol/L   Salicylate level   Result Value Ref Range    Salicylate Level 4 mg/dL   Glucose by meter   Result Value Ref Range    Glucose 201 (H) 70 - 99 mg/dL   Glucose by meter   Result Value Ref Range    Glucose 171 (H) 70 - 99 mg/dL   Basic metabolic panel   Result Value Ref Range    Sodium 147 (H) 133 - 144 mmol/L    Potassium 3.3 (L) 3.4 -  5.3 mmol/L    Chloride 114 (H) 94 - 109 mmol/L    Carbon Dioxide 14 (L) 20 - 32 mmol/L    Anion Gap 19 (H) 3 - 14 mmol/L    Glucose 150 (H) 70 - 99 mg/dL    Urea Nitrogen 21 7 - 30 mg/dL    Creatinine 1.19 (H) 0.52 - 1.04 mg/dL    GFR Estimate 57 (L) >60 mL/min/1.7m2    GFR Estimate If Black 69 >60 mL/min/1.7m2    Calcium 6.8 (L) 8.5 - 10.1 mg/dL   Basic metabolic panel   Result Value Ref Range    Sodium 146 (H) 133 - 144 mmol/L    Potassium 3.5 3.4 - 5.3 mmol/L    Chloride 113 (H) 94 - 109 mmol/L    Carbon Dioxide 15 (L) 20 - 32 mmol/L    Anion Gap 18 (H) 3 - 14 mmol/L    Glucose 121 (H) 70 - 99 mg/dL    Urea Nitrogen 20 7 - 30 mg/dL    Creatinine 1.13 (H) 0.52 - 1.04 mg/dL    GFR Estimate 60 (L) >60 mL/min/1.7m2    GFR Estimate If Black 73 >60 mL/min/1.7m2    Calcium 7.2 (L) 8.5 - 10.1 mg/dL   CBC with platelets differential   Result Value Ref Range    WBC 20.4 (H) 4.0 - 11.0 10e9/L    RBC Count 3.77 (L) 3.8 - 5.2 10e12/L    Hemoglobin 11.2 (L) 11.7 - 15.7 g/dL    Hematocrit 34.0 (L) 35.0 - 47.0 %    MCV 90 78 - 100 fl    MCH 29.7 26.5 - 33.0 pg    MCHC 32.9 31.5 - 36.5 g/dL    RDW 14.1 10.0 - 15.0 %    Platelet Count 260 150 - 450 10e9/L    Diff Method Automated Method     % Neutrophils 74.5 %    % Lymphocytes 11.5 %    % Monocytes 13.1 %    % Eosinophils 0.0 %    % Basophils 0.0 %    % Immature Granulocytes 0.9 %    Nucleated RBCs 0 0 /100    Absolute Neutrophil 15.2 (H) 1.6 - 8.3 10e9/L    Absolute Lymphocytes 2.4 0.8 - 5.3 10e9/L    Absolute Monocytes 2.7 (H) 0.0 - 1.3 10e9/L    Absolute Eosinophils 0.0 0.0 - 0.7 10e9/L    Absolute Basophils 0.0 0.0 - 0.2 10e9/L    Abs Immature Granulocytes 0.2 0 - 0.4 10e9/L    Absolute Nucleated RBC 0.0     RBC Morphology Normal     Platelet Estimate Confirming automated cell count    Hepatic panel   Result Value Ref Range    Bilirubin Direct <0.1 0.0 - 0.2 mg/dL    Bilirubin Total 0.4 0.2 - 1.3 mg/dL    Albumin 2.5 (L) 3.4 - 5.0 g/dL    Protein Total 5.5 (L) 6.8 - 8.8 g/dL     Alkaline Phosphatase 89 40 - 150 U/L    ALT 15 0 - 50 U/L    AST 15 0 - 45 U/L   Ketone quantitative   Result Value Ref Range    Ketone Quantitative 4.1 (HH) 0.0 - 0.6 mmol/L   Ketone quantitative   Result Value Ref Range    Ketone Quantitative 4.4 (HH) 0.0 - 0.6 mmol/L   Lactic acid whole blood   Result Value Ref Range    Lactic Acid 1.0 0.7 - 2.1 mmol/L   Magnesium   Result Value Ref Range    Magnesium 1.5 (L) 1.6 - 2.3 mg/dL   Phosphorus   Result Value Ref Range    Phosphorus 0.6 (LL) 2.5 - 4.5 mg/dL   Procalcitonin   Result Value Ref Range    Procalcitonin 3.32 ng/ml   Glucose by meter   Result Value Ref Range    Glucose 152 (H) 70 - 99 mg/dL   Blood gas arterial and oxyhgb   Result Value Ref Range    pH Arterial 7.32 (L) 7.35 - 7.45 pH    pCO2 Arterial 25 (L) 35 - 45 mm Hg    pO2 Arterial 97 80 - 105 mm Hg    Bicarbonate Arterial 13 (L) 21 - 28 mmol/L    FIO2 21.0     Oxyhemoglobin Arterial 97 92 - 100 %    Base Deficit Art 12.2 mmol/L   Glucose by meter   Result Value Ref Range    Glucose 225 (H) 70 - 99 mg/dL   Glucose by meter   Result Value Ref Range    Glucose 159 (H) 70 - 99 mg/dL   Blood gas venous   Result Value Ref Range    Ph Venous 7.27 (L) 7.32 - 7.43 pH    PCO2 Venous 24 (L) 40 - 50 mm Hg    PO2 Venous 52 (H) 25 - 47 mm Hg    Bicarbonate Venous 11 (L) 21 - 28 mmol/L    Base Deficit Venous 14.8 mmol/L    FIO2 21    Glucose by meter   Result Value Ref Range    Glucose 89 70 - 99 mg/dL   Glucose by meter   Result Value Ref Range    Glucose 165 (H) 70 - 99 mg/dL     *Note: Due to a large number of results and/or encounters for the requested time period, some results have not been displayed. A complete set of results can be found in Results Review.      No results found for this or any previous visit (from the past 48 hour(s)).             Pending Results:   Unresulted Labs Ordered in the Past 30 Days of this Admission     No orders found from 11/15/2016 to 1/14/2017.                  Discharge  Instructions and Follow-Up:     Discharge Procedure Orders  Reason for your hospital stay   Order Comments: High blood glucose and diabetic ketoacidosis     Activity   Order Comments: Your activity upon discharge: activity as tolerated   Order Specific Question Answer Comments   Is discharge order? Yes      Discharge Instructions   Order Comments: Check Blood glucose 4 times a day and keep a log of levels over next two days and call endocrine doctors office on next thrusday with blood glucose levels.     Adult [facility] Follow-up and recommended labs and tests   Order Comments: FU with endocrinology in two weeks time for management of diabetes.     Appointments on [place] and/or [facility] (with [facility] or [facility] provider or service). Call [phone] if you haven't heard regarding these appointments within 7 days of discharge.     Full Code     Diet   Order Comments: Follow this diet upon discharge: Orders Placed This Encounter  Room Service  Moderate Consistent CHO Diet   Order Specific Question Answer Comments   Is discharge order? Yes               Anastacia Alatorre  Internal Medicine Staff Hospitalist  [phone]  January 23, 2017        Time spent on patient: 40 minutes total including face to face and coordinating care time reviewing current illness, any medication changes, and the care plan for today.

## 2017-03-07 DIAGNOSIS — E10.9 TYPE 1 DIABETES MELLITUS WITHOUT COMPLICATION (H): ICD-10-CM

## 2017-03-15 ENCOUNTER — TELEPHONE (OUTPATIENT)
Dept: ENDOCRINOLOGY | Facility: CLINIC | Age: 22
End: 2017-03-15

## 2017-03-15 NOTE — TELEPHONE ENCOUNTER
----- Message from Marycarmen Cedillo sent at 3/14/2017  3:50 PM CDT -----  Regarding: rtn diabetes reschedule   Pt rescheduled for next month instead of March 16 @ 830    Just an FYI     Thank you!  Marycarmen Cedillo  Intake representative  Call Center

## 2017-03-24 ENCOUNTER — TELEPHONE (OUTPATIENT)
Dept: ENDOCRINOLOGY | Facility: CLINIC | Age: 22
End: 2017-03-24

## 2017-03-24 NOTE — TELEPHONE ENCOUNTER
Type 1 diabetic . Mother calling to report  High BS since yesterday started at 500  They did bolus every 3 hours and got the  number  down to 123 but today they are back high at 350.  Mother was instructed to take her to the ER for management and IV fluids. She  has been sleeping  a lot and not eating  much  with  the higher numbers.

## 2017-03-24 NOTE — TELEPHONE ENCOUNTER
"Mom called, suspected pt in early stages of DKA. 3/23/17 AM B. Able to bring down to 123. Today (3/24/17) BG in 200s, 300s. Pt had not eaten since previous AM. Pt has depression, still sleeping more past day, mom says not lethargic, acting \"a little behind,\" headache, nausea previous AM, no vomiting, thought breath \"possibly\" had fruity odor. Vision status unknown. No infection, no steroids. Advised to take pt to ED. Triage RN followed Endo Clinic Protocol in assessment of pt. Sent to [facility].  "

## 2017-03-27 ENCOUNTER — OFFICE VISIT (OUTPATIENT)
Dept: LAB | Facility: SCHOOL | Age: 22
End: 2017-03-27

## 2017-03-27 VITALS — OXYGEN SATURATION: 100 % | HEART RATE: 113 BPM | DIASTOLIC BLOOD PRESSURE: 84 MMHG | SYSTOLIC BLOOD PRESSURE: 132 MMHG

## 2017-03-27 DIAGNOSIS — T14.8XXA ABRASION: Primary | ICD-10-CM

## 2017-03-27 PROCEDURE — 99202 OFFICE O/P NEW SF 15 MIN: CPT | Performed by: PHYSICIAN ASSISTANT

## 2017-03-27 NOTE — MR AVS SNAPSHOT
After Visit Summary   3/27/2017    Harsha Delgadillo    MRN: [id number]           Patient Information     Date Of Birth          1995        Visit Information        Provider Department      3/27/2017 10:40 AM Michelle Tucker PA-C [facility]        Today's Diagnoses     Abrasion    -  1       Follow-ups after your visit        Your next 10 appointments already scheduled     Apr 27, 2017 10:30 AM CDT   (Arrive by 10:15 AM)   RETURN DIABETES with Roberto Bland MD   [facility] ([facility])    [address] [place] [id number] [phone]              Who to contact     You can reach your care team any time of the day by calling [phone].  Notification of test results:  If you have an abnormal lab result, we will notify you by phone as soon as possible.         Additional Information About Your Visit        MyChart Information     [employer] gives you secure access to your electronic health record. If you see a primary care provider, you can also send messages to your care team and make appointments. If you have questions, please call your primary care clinic.  If you do not have a primary care provider, please call [phone] and they will assist you.        Care EveryWhere ID     This is your Care EveryWhere ID. This could be used by other organizations to access your [facility] medical records  [id number]        Your Vitals Were     Pulse Last Period Pulse Oximetry             113 03/06/2017 100%          Blood Pressure from Last 3 Encounters:   03/27/17 132/84   01/16/17 (!) 128/91   12/15/16 127/89    Weight from Last 3 Encounters:   01/15/17 174 lb 4.8 oz (79.1 kg)   12/15/16 168 lb 11.2 oz (76.5 kg)   10/06/16 173 lb (78.5 kg)              Today, you had the following     No orders found for display       Primary Care Provider    None       No address on file        Thank  you!     Thank you for choosing [facility]  for your care. Our goal is always to provide you with excellent care. Hearing back from our patients is one way we can continue to improve our services. Please take a few minutes to complete the written survey that you may receive in the mail after your visit with us. Thank you!             Your Updated Medication List - Protect others around you: Learn how to safely use, store and throw away your medicines at www.disposemymeds.org.          This list is accurate as of: 3/27/17 11:01 AM.  Always use your most recent med list.                   Brand Name Dispense Instructions for use    acetone (Urine) test Strp          blood glucose monitoring lancets     1 Box    Use to test blood sugar 6-8 times daily or as directed.  Ok to substitute alternative if insurance prefers.       blood glucose monitoring test strip    ACCU-CHEK BUNNY PLUS    200 each    Use to test blood sugar 6-8 times daily or as directed.  Ok to substitute alternative if insurance prefers.       FLUoxetine 20 MG capsule    PROzac    30 capsule    Take 1 capsule (20 mg) by mouth daily       glucagon 1 MG kit     1 mg    Inject 1 mg into the muscle once for 1 dose       insulin aspart 100 UNITS/ML injection    NovoLOG    10 mL    If Pre-meal Glucose 140 - 164 give 1 unit 165 - 189 give 2 units 190 - 214 give 3 units 215 - 239 give 4 units 240 - 264 give 5 units 265 - 289 give 6 units 290 - 314 give 7 units 315 - 339 give 8 units > 339 give 9 units  If Bedtime Glucose 200 - 214 give 1.5 units  215 - 239 give 2 units 240 - 264 give 2.5 units 265 - 289 give 3 units 290 - 314 give 3.5 units 315 - 339 give 4 units > 339 give 4.5 units       insulin glargine 100 UNIT/ML injection    LANTUS SOLOSTAR    45 mL    Inject 42 Units Subcutaneous daily (with dinner)       insulin lispro 100 UNIT/ML Cartridge    HumaLOG PENFILL    15 mL    For use with Luxura pen device. (patient has  "pen device already). Use 1 unit: 7 grams of carbohydrate for meals and snacks. Use correction scale per instructions.       Pen Needles 3/16\" 31G X 5 MM Misc          phosphorus tablet 250 mg 250 MG per tablet    K PHOS NEUTRAL    6 tablet    Take 1 tablet (250 mg) by mouth 2 times daily         "

## 2017-03-27 NOTE — PROGRESS NOTES
SUBJECTIVE:                                                    Harsha Delgadillo is a 21 year old female who presents to clinic today for the following health issues:      Running fell, scrapes to knees, both hands and L shoulder.  Did not hit head.   She is type 1 diabetic.               OBJECTIVE:                                                    /84  Pulse 113  LMP 03/06/2017  SpO2 100%  There is no height or weight on file to calculate BMI.  GENERAL: healthy, alert and no distress  MS: no gross musculoskeletal defects noted, no edema  SKIN: superficial abrasion to bilateral knees, avulsion to right hand, superficial abrasion to left shoulder and to right thumb         ASSESSMENT/PLAN:                                                      (T14.8) Abrasion  (primary encounter diagnosis)    Cleaned with peroxide and sterile water. Dressed with bacitracin and bandaid. Td up to date.  Counseled in signs of infection in which to seek FU care.      Michelle Tucker PA-C  [facility]

## 2017-06-19 DIAGNOSIS — E10.9 TYPE 1 DIABETES MELLITUS WITHOUT COMPLICATION (H): ICD-10-CM

## 2017-06-19 DIAGNOSIS — E10.9 DIABETES MELLITUS TYPE 1 (H): Primary | ICD-10-CM

## 2017-06-27 DIAGNOSIS — E10.9 TYPE 1 DIABETES MELLITUS WITHOUT COMPLICATION (H): ICD-10-CM

## 2017-08-21 DIAGNOSIS — E10.9 TYPE 1 DIABETES MELLITUS WITHOUT COMPLICATION (H): ICD-10-CM

## 2017-11-07 DIAGNOSIS — F32.A DEPRESSION, UNSPECIFIED DEPRESSION TYPE: ICD-10-CM

## 2017-11-09 NOTE — TELEPHONE ENCOUNTER
FLUoxetine      Last Written Prescription Date:  10/6/16  Last Fill Quantity: 30,   # refills: 2  Last Office Visit : 12/15/16  Future Office visit:  NONE  Routing refill request to provider for review/approval because:  Drug not on refill protocol or controlled substance

## 2017-11-10 NOTE — TELEPHONE ENCOUNTER
Please advise patient that this prescription should come from her PCP or her psychiatrist.   A one time prescription refill provided until she establishes care with providers.

## 2017-11-13 ENCOUNTER — TELEPHONE (OUTPATIENT)
Dept: ENDOCRINOLOGY | Facility: CLINIC | Age: 22
End: 2017-11-13

## 2017-11-13 DIAGNOSIS — E10.9 TYPE 1 DIABETES MELLITUS WITHOUT COMPLICATION (H): ICD-10-CM

## 2017-11-13 NOTE — TELEPHONE ENCOUNTER
Left vm informing pt: refill request for Humalog 100 u/mL kwikpen inj 5x3mL at [facility] in [facility] for one 90 day supply with no refills. ALso requested for pt to make appt at clinic for follow up for further refills.

## 2017-11-15 ENCOUNTER — TELEPHONE (OUTPATIENT)
Dept: ENDOCRINOLOGY | Facility: CLINIC | Age: 22
End: 2017-11-15

## 2017-11-15 DIAGNOSIS — E10.9 TYPE 1 DIABETES MELLITUS WITHOUT COMPLICATION (H): ICD-10-CM

## 2017-12-19 ENCOUNTER — TELEPHONE (OUTPATIENT)
Dept: ENDOCRINOLOGY | Facility: CLINIC | Age: 22
End: 2017-12-19

## 2017-12-19 NOTE — TELEPHONE ENCOUNTER
Multiple messages left for  for Harsha to  schedule a f/u with no return calls. Last script went to [place]  so  assuming she has moved and will establish care there.  Refill request should go back to  the pharmacy to contact her new provider.

## 2017-12-19 NOTE — TELEPHONE ENCOUNTER
----- Message from Sarika PERDOMO Link sent at 12/11/2017 12:25 PM CST -----  Regarding: RE: follow up appt  We have been unable to reach patients after multiple attempts to contact. The patient is not responding to our attempts at reaching her. I've let her know that without her scheduling an appt we will be unable to fill any future prescriptions.    Thanks,  Sarika  ----- Message -----     From: Fiorella Feliz RN     Sent: 11/15/2017  10:42 AM       To: [id number]  Subject: follow up appt                                   Please contact pt to schedule follow up here and new provider closer to home. She needs to be seen for future refills. Thank you.

## 2018-01-07 ENCOUNTER — HEALTH MAINTENANCE LETTER (OUTPATIENT)
Age: 23
End: 2018-01-07

## 2020-03-10 ENCOUNTER — HEALTH MAINTENANCE LETTER (OUTPATIENT)
Age: 25
End: 2020-03-10

## 2020-12-27 ENCOUNTER — HEALTH MAINTENANCE LETTER (OUTPATIENT)
Age: 25
End: 2020-12-27

## 2021-04-24 ENCOUNTER — HEALTH MAINTENANCE LETTER (OUTPATIENT)
Age: 26
End: 2021-04-24

## 2021-08-14 ENCOUNTER — HEALTH MAINTENANCE LETTER (OUTPATIENT)
Age: 26
End: 2021-08-14

## 2021-10-09 ENCOUNTER — HEALTH MAINTENANCE LETTER (OUTPATIENT)
Age: 26
End: 2021-10-09

## 2022-03-20 ENCOUNTER — HEALTH MAINTENANCE LETTER (OUTPATIENT)
Age: 27
End: 2022-03-20

## 2022-04-20 NOTE — PLAN OF CARE
Problem: Goal Outcome Summary  Goal: Goal Outcome Summary  Patient A/O, VSS. SIRS protocol triggered, LA result was 2.6. BG at bedtime was 139, 200 at 0200. Pt stated difficulty falling asleep, writer suggested melatonin and PRN dose was ordered. Vanco currently infusing, pt would like to shower when possible. Supplies provided. Denies pain. Will continue to monitor and alert MDs to any changes.          Negative

## 2022-05-16 ENCOUNTER — HEALTH MAINTENANCE LETTER (OUTPATIENT)
Age: 27
End: 2022-05-16

## 2022-09-11 ENCOUNTER — HEALTH MAINTENANCE LETTER (OUTPATIENT)
Age: 27
End: 2022-09-11

## 2023-01-23 ENCOUNTER — HEALTH MAINTENANCE LETTER (OUTPATIENT)
Age: 28
End: 2023-01-23

## 2023-06-03 ENCOUNTER — HEALTH MAINTENANCE LETTER (OUTPATIENT)
Age: 28
End: 2023-06-03

## 2023-07-29 ENCOUNTER — HEALTH MAINTENANCE LETTER (OUTPATIENT)
Age: 28
End: 2023-07-29

## 2024-02-24 ENCOUNTER — HEALTH MAINTENANCE LETTER (OUTPATIENT)
Age: 29
End: 2024-02-24